# Patient Record
Sex: FEMALE | Race: WHITE | NOT HISPANIC OR LATINO | Employment: UNEMPLOYED | URBAN - METROPOLITAN AREA
[De-identification: names, ages, dates, MRNs, and addresses within clinical notes are randomized per-mention and may not be internally consistent; named-entity substitution may affect disease eponyms.]

---

## 2024-04-04 ENCOUNTER — TELEPHONE (OUTPATIENT)
Dept: BEHAVIORAL HEALTH | Facility: CLINIC | Age: 49
End: 2024-04-04
Payer: COMMERCIAL

## 2024-04-04 ENCOUNTER — PATIENT MESSAGE (OUTPATIENT)
Dept: BEHAVIORAL HEALTH | Facility: CLINIC | Age: 49
End: 2024-04-04
Payer: COMMERCIAL

## 2024-04-04 ENCOUNTER — OFFICE VISIT (OUTPATIENT)
Dept: FAMILY MEDICINE | Facility: CLINIC | Age: 49
End: 2024-04-04
Payer: COMMERCIAL

## 2024-04-04 VITALS
DIASTOLIC BLOOD PRESSURE: 78 MMHG | BODY MASS INDEX: 20.66 KG/M2 | SYSTOLIC BLOOD PRESSURE: 122 MMHG | TEMPERATURE: 98 F | OXYGEN SATURATION: 98 % | WEIGHT: 152.56 LBS | HEART RATE: 89 BPM | HEIGHT: 72 IN

## 2024-04-04 DIAGNOSIS — Z76.89 ENCOUNTER TO ESTABLISH CARE: ICD-10-CM

## 2024-04-04 DIAGNOSIS — M54.41 LOW BACK PAIN WITH RIGHT-SIDED SCIATICA, UNSPECIFIED BACK PAIN LATERALITY, UNSPECIFIED CHRONICITY: ICD-10-CM

## 2024-04-04 DIAGNOSIS — F41.1 GENERALIZED ANXIETY DISORDER: ICD-10-CM

## 2024-04-04 DIAGNOSIS — Z00.00 ENCOUNTER FOR ANNUAL GENERAL MEDICAL EXAMINATION WITHOUT ABNORMAL FINDINGS IN ADULT: Primary | ICD-10-CM

## 2024-04-04 DIAGNOSIS — F10.10 ALCOHOL ABUSE: ICD-10-CM

## 2024-04-04 DIAGNOSIS — Z12.11 COLON CANCER SCREENING: ICD-10-CM

## 2024-04-04 DIAGNOSIS — F33.1 MODERATE EPISODE OF RECURRENT MAJOR DEPRESSIVE DISORDER: ICD-10-CM

## 2024-04-04 PROCEDURE — 99205 OFFICE O/P NEW HI 60 MIN: CPT | Mod: S$PBB,,, | Performed by: STUDENT IN AN ORGANIZED HEALTH CARE EDUCATION/TRAINING PROGRAM

## 2024-04-04 PROCEDURE — 99205 OFFICE O/P NEW HI 60 MIN: CPT | Mod: PBBFAC,PO | Performed by: STUDENT IN AN ORGANIZED HEALTH CARE EDUCATION/TRAINING PROGRAM

## 2024-04-04 PROCEDURE — 99999 PR PBB SHADOW E&M-NEW PATIENT-LVL V: CPT | Mod: PBBFAC,,, | Performed by: STUDENT IN AN ORGANIZED HEALTH CARE EDUCATION/TRAINING PROGRAM

## 2024-04-04 RX ORDER — NALTREXONE HYDROCHLORIDE 50 MG/1
50 TABLET, FILM COATED ORAL DAILY
COMMUNITY

## 2024-04-04 RX ORDER — TIZANIDINE 2 MG/1
4 TABLET ORAL EVERY 8 HOURS PRN
Qty: 30 TABLET | Refills: 0 | Status: SHIPPED | OUTPATIENT
Start: 2024-04-04 | End: 2024-04-14

## 2024-04-04 RX ORDER — IBUPROFEN 800 MG/1
800 TABLET ORAL 3 TIMES DAILY
Qty: 90 TABLET | Refills: 0 | Status: SHIPPED | OUTPATIENT
Start: 2024-04-04 | End: 2024-05-04

## 2024-04-04 RX ORDER — VENLAFAXINE 75 MG/1
75 TABLET ORAL 2 TIMES DAILY
COMMUNITY

## 2024-04-04 NOTE — PROGRESS NOTES
SUBJECTIVE:    CHIEF COMPLAINT:   Chief Complaint   Patient presents with    Establish Care     Back pain           274}    HISTORY OF PRESENT ILLNESS:  Iam Metz is a 48 y.o. female  with a past medical history of anxiety, depression, scoliosis and alcohol use disorder  here to establish care.   She reports that she continues to receive care from behavioral health / psychiatrist in [place].  She ass recently relocated and has been present in [facility] for approximately 1 year.  She is tearful as she is no longer engaged to her prior fiancee.     She does report worsening low back pain which is exacerbated in extension and somewhat improved with flexion forward. denies any chest pain, shortness of breath, abdominal pain, fevers, chills, nausea, vomiting, diarrhea or dysuria.     PAST MEDICAL HISTORY:     274}  Past Medical History:   Diagnosis Date    Anxiety     Depression     Scoliosis        PAST SURGICAL HISTORY:  Past Surgical History:   Procedure Laterality Date    arm surgery      breast implants         SOCIAL HISTORY:  Social History     Socioeconomic History    Marital status: Significant Other   Tobacco Use    Smoking status: Every Day     Current packs/day: 1.00     Types: Cigarettes    Smokeless tobacco: Never     Social Determinants of Health     Financial Resource Strain: High Risk (2/20/2024)    Overall Financial Resource Strain (CARDIA)     Difficulty of Paying Living Expenses: Very hard   Food Insecurity: Food Insecurity Present (2/20/2024)    Hunger Vital Sign     Worried About Running Out of Food in the Last Year: Sometimes true     Ran Out of Food in the Last Year: Sometimes true   Transportation Needs: No Transportation Needs (2/20/2024)    PRAPARE - Transportation     Lack of Transportation (Medical): No     Lack of Transportation (Non-Medical): No   Physical Activity: Insufficiently Active (2/20/2024)    Exercise Vital Sign     Days of Exercise per Week: 2 days     Minutes of  Exercise per Session: 30 min   Stress: Stress Concern Present (2/20/2024)    [employer] [place] [employer] - Occupational Stress Questionnaire     Feeling of Stress : Rather much   Social Connections: Unknown (2/20/2024)    Social Connection and Isolation Panel [NHANES]     Frequency of Communication with Friends and Family: Once a week     Frequency of Social Gatherings with Friends and Family: Never     Active Member of Clubs or Organizations: No     Attends Club or Organization Meetings: Never     Marital Status: Living with partner   Housing Stability: Unknown (2/20/2024)    Housing Stability Vital Sign     Unable to Pay for Housing in the Last Year: Patient declined     Number of Places Lived in the Last Year: 1     Unstable Housing in the Last Year: No       FAMILY HISTORY:       Family History   Problem Relation Age of Onset    Hypertension Mother     Atrial fibrillation Mother     Depression Father     Alzheimer's disease Father        ALLERGIES AND MEDICATIONS: updated and reviewed.      274}  Review of patient's allergies indicates:  Not on File  Medication List with Changes/Refills   New Medications    IBUPROFEN (ADVIL,MOTRIN) 800 MG TABLET    Take 1 tablet (800 mg total) by mouth 3 (three) times daily. For back pain    TIZANIDINE (ZANAFLEX) 2 MG TABLET    Take 2 tablets (4 mg total) by mouth every 8 (eight) hours as needed (back pain).   Current Medications    LAMOTRIGINE (LAMICTAL ORAL)    Take by mouth once daily.    NALTREXONE (DEPADE) 50 MG TABLET    Take 50 mg by mouth once daily.    VENLAFAXINE (EFFEXOR) 75 MG TABLET    Take 75 mg by mouth 2 (two) times daily.       SCREENING HISTORY:    274}  Health Maintenance         Date Due Completion Date    Hepatitis C Screening Never done ---    Cervical Cancer Screening Never done ---    Lipid Panel Never done ---    COVID-19 Vaccine (1) Never done ---    Pneumococcal Vaccines (Age 0-64) (1 of 2 - PCV) Never done ---    HIV Screening Never done  ---    TETANUS VACCINE Never done ---    Mammogram Never done ---    Colorectal Cancer Screening Never done ---    Influenza Vaccine (1) Never done ---            REVIEW OF SYSTEMS:   Review of Systems   All other systems reviewed and are negative.      PHYSICAL EXAM:      274}  /78 (BP Location: Right arm, Patient Position: Sitting, BP Method: Small (Manual))   Pulse 89   Temp 97.9 °F (36.6 °C)   Ht 6' (1.829 m)   Wt 69.2 kg (152 lb 8.9 oz)   SpO2 98%   BMI 20.69 kg/m²   Wt Readings from Last 3 Encounters:   04/04/24 69.2 kg (152 lb 8.9 oz)     BP Readings from Last 3 Encounters:   04/04/24 122/78     Estimated body mass index is 20.69 kg/m² as calculated from the following:    Height as of this encounter: 6' (1.829 m).    Weight as of this encounter: 69.2 kg (152 lb 8.9 oz).     Physical Exam  Vitals reviewed.   Constitutional:       General: She is not in acute distress.     Appearance: Normal appearance. She is well-developed and normal weight. She is not ill-appearing.   HENT:      Head: Normocephalic and atraumatic.      Right Ear: External ear normal.      Left Ear: External ear normal.      Nose: Nose normal.   Eyes:      Extraocular Movements: Extraocular movements intact.      Conjunctiva/sclera: Conjunctivae normal.      Pupils: Pupils are equal, round, and reactive to light.   Neck:      Thyroid: No thyroid mass.   Cardiovascular:      Rate and Rhythm: Normal rate and regular rhythm.      Pulses: Normal pulses.      Heart sounds: Normal heart sounds, S1 normal and S2 normal. No murmur heard.     No gallop.   Pulmonary:      Effort: Pulmonary effort is normal. No respiratory distress.      Breath sounds: Normal breath sounds and air entry. No stridor. No wheezing, rhonchi or rales.   Abdominal:      General: There is no distension.      Palpations: Abdomen is soft. There is no mass.      Tenderness: There is no abdominal tenderness.   Musculoskeletal:         General: No swelling or deformity.  "Normal range of motion.      Cervical back: Normal range of motion and neck supple. No edema or erythema.   Skin:     General: Skin is warm and dry.      Findings: No lesion or rash.   Neurological:      Mental Status: She is alert and oriented to person, place, and time. Mental status is at baseline.   Psychiatric:         Mood and Affect: Mood normal.         Behavior: Behavior normal. Behavior is cooperative.         Judgment: Judgment normal.         LABS:   274}  I have reviewed old labs below:  No results found for: "WBC", "HGB", "HCT", "MCV", "PLT", "NA", "K", "CL", "CALCIUM", "PHOS", "CO2", "GLU", "BUN", "CREATININE", "ANIONGAP", "ESTGFRAFRICA", "EGFRNONAA", "PROT", "ALBUMIN", "BILITOT", "ALKPHOS", "ALT", "AST", "INR", "CHOL", "TRIG", "HDL", "LDLCALC", "TSH", "PSA", "GLUF", "HGBA1C", "MICROALBUR"    ASSESSMENT AND PLAN:  274}  1. Encounter for annual general medical examination without abnormal findings in adult  -     HIV 1/2 Ag/Ab (4th Gen); Future; Expected date: 04/04/2024  -     Hepatitis C Antibody; Future; Expected date: 04/04/2024  -     TSH; Future; Expected date: 04/04/2024  -     Lipid Panel; Future; Expected date: 04/04/2024  -     Hemoglobin A1C; Future; Expected date: 04/04/2024  -     Comprehensive Metabolic Panel; Future; Expected date: 04/04/2024  -     CBC Without Differential; Future; Expected date: 04/04/2024  -     Ferritin; Future; Expected date: 04/04/2024    2. Encounter to establish care    3. Moderate episode of recurrent major depressive disorder  Assessment & Plan:  On effexor 75mg PO daily    Orders:  -     Ambulatory referral/consult to Psychiatry; Future; Expected date: 04/11/2024  -     Ambulatory referral/consult to Primary Care Behavioral Health (Non-Opioids); Future; Expected date: 04/11/2024    4. Generalized anxiety disorder  Assessment & Plan:  Effexor 75mg PO daily, and Lamical therapy.    Orders:  -     Ambulatory referral/consult to Psychiatry; Future; Expected date: " 04/11/2024  -     Ambulatory referral/consult to Primary Care Behavioral Health (Non-Opioids); Future; Expected date: 04/11/2024    5. Alcohol abuse  Comments:  Consumes 6-8 Budlight beers every other day. stated on Naltrexone per sch  Orders:  -     Ambulatory referral/consult to Psychiatry; Future; Expected date: 04/11/2024  -     Ambulatory referral/consult to Primary Care Behavioral Health (Non-Opioids); Future; Expected date: 04/11/2024    6. Low back pain with right-sided sciatica, unspecified back pain laterality, unspecified chronicity  -     CT Lumbar Spine Without Contrast; Future; Expected date: 04/04/2024  -     ibuprofen (ADVIL,MOTRIN) 800 MG tablet; Take 1 tablet (800 mg total) by mouth 3 (three) times daily. For back pain  Dispense: 90 tablet; Refill: 0  -     tiZANidine (ZANAFLEX) 2 MG tablet; Take 2 tablets (4 mg total) by mouth every 8 (eight) hours as needed (back pain).  Dispense: 30 tablet; Refill: 0  -     ALCOHOL,MEDICAL (ETHANOL); Future; Expected date: 04/04/2024    7. Colon cancer screening  -     Cologuard Screening (Multitarget Stool DNA); Future; Expected date: 04/04/2024           Orders Placed This Encounter   Procedures    Cologuard Screening (Multitarget Stool DNA)    CT Lumbar Spine Without Contrast    HIV 1/2 Ag/Ab (4th Gen)    Hepatitis C Antibody    TSH    Lipid Panel    Hemoglobin A1C    Comprehensive Metabolic Panel    CBC Without Differential    Ferritin    ALCOHOL,MEDICAL (ETHANOL)    Ambulatory referral/consult to Psychiatry    Ambulatory referral/consult to Primary Care Behavioral Health (Non-Opioids)       Follow up in about 2 weeks (around 4/18/2024) for f/u back pain, CT and labs. or sooner as needed.

## 2024-04-09 ENCOUNTER — HOSPITAL ENCOUNTER (OUTPATIENT)
Dept: RADIOLOGY | Facility: HOSPITAL | Age: 49
Discharge: HOME | End: 2024-04-09
Attending: STUDENT IN AN ORGANIZED HEALTH CARE EDUCATION/TRAINING PROGRAM

## 2024-04-09 ENCOUNTER — LAB VISIT (OUTPATIENT)
Dept: LAB | Facility: HOSPITAL | Age: 49
End: 2024-04-09
Attending: STUDENT IN AN ORGANIZED HEALTH CARE EDUCATION/TRAINING PROGRAM
Payer: COMMERCIAL

## 2024-04-09 DIAGNOSIS — Z00.00 ENCOUNTER FOR ANNUAL GENERAL MEDICAL EXAMINATION WITHOUT ABNORMAL FINDINGS IN ADULT: ICD-10-CM

## 2024-04-09 DIAGNOSIS — M54.41 LOW BACK PAIN WITH RIGHT-SIDED SCIATICA, UNSPECIFIED BACK PAIN LATERALITY, UNSPECIFIED CHRONICITY: ICD-10-CM

## 2024-04-09 LAB
ALBUMIN SERPL BCP-MCNC: 4 G/DL (ref 3.5–5.2)
ALP SERPL-CCNC: 75 U/L (ref 55–135)
ALT SERPL W/O P-5'-P-CCNC: 20 U/L (ref 10–44)
ANION GAP SERPL CALC-SCNC: 12 MMOL/L (ref 8–16)
AST SERPL-CCNC: 21 U/L (ref 10–40)
BILIRUB SERPL-MCNC: 0.3 MG/DL (ref 0.1–1)
BUN SERPL-MCNC: 14 MG/DL (ref 6–20)
CALCIUM SERPL-MCNC: 10.1 MG/DL (ref 8.7–10.5)
CHLORIDE SERPL-SCNC: 103 MMOL/L (ref 95–110)
CHOLEST SERPL-MCNC: 238 MG/DL (ref 120–199)
CHOLEST/HDLC SERPL: 3.1 {RATIO} (ref 2–5)
CO2 SERPL-SCNC: 27 MMOL/L (ref 23–29)
CREAT SERPL-MCNC: 0.8 MG/DL (ref 0.5–1.4)
ERYTHROCYTE [DISTWIDTH] IN BLOOD BY AUTOMATED COUNT: 12.8 % (ref 11.5–14.5)
EST. GFR  (NO RACE VARIABLE): >60 ML/MIN/1.73 M^2
ESTIMATED AVG GLUCOSE: 108 MG/DL (ref 68–131)
FERRITIN SERPL-MCNC: 46 NG/ML (ref 20–300)
GLUCOSE SERPL-MCNC: 104 MG/DL (ref 70–110)
HBA1C MFR BLD: 5.4 % (ref 4–5.6)
HCT VFR BLD AUTO: 45.3 % (ref 37–48.5)
HCV AB SERPL QL IA: NORMAL
HDLC SERPL-MCNC: 78 MG/DL (ref 40–75)
HDLC SERPL: 32.8 % (ref 20–50)
HGB BLD-MCNC: 14.3 G/DL (ref 12–16)
HIV 1+2 AB+HIV1 P24 AG SERPL QL IA: NORMAL
LDLC SERPL CALC-MCNC: 144 MG/DL (ref 63–159)
MCH RBC QN AUTO: 29.5 PG (ref 27–31)
MCHC RBC AUTO-ENTMCNC: 31.6 G/DL (ref 32–36)
MCV RBC AUTO: 93 FL (ref 82–98)
NONHDLC SERPL-MCNC: 160 MG/DL
PLATELET # BLD AUTO: 258 K/UL (ref 150–450)
PMV BLD AUTO: 10.8 FL (ref 9.2–12.9)
POTASSIUM SERPL-SCNC: 4.7 MMOL/L (ref 3.5–5.1)
PROT SERPL-MCNC: 7.3 G/DL (ref 6–8.4)
RBC # BLD AUTO: 4.85 M/UL (ref 4–5.4)
SODIUM SERPL-SCNC: 142 MMOL/L (ref 136–145)
TRIGL SERPL-MCNC: 80 MG/DL (ref 30–150)
TSH SERPL DL<=0.005 MIU/L-ACNC: 1.68 UIU/ML (ref 0.4–4)
WBC # BLD AUTO: 7.33 K/UL (ref 3.9–12.7)

## 2024-04-09 PROCEDURE — 85027 COMPLETE CBC AUTOMATED: CPT | Performed by: STUDENT IN AN ORGANIZED HEALTH CARE EDUCATION/TRAINING PROGRAM

## 2024-04-09 PROCEDURE — 82728 ASSAY OF FERRITIN: CPT | Performed by: STUDENT IN AN ORGANIZED HEALTH CARE EDUCATION/TRAINING PROGRAM

## 2024-04-09 PROCEDURE — 80061 LIPID PANEL: CPT | Performed by: STUDENT IN AN ORGANIZED HEALTH CARE EDUCATION/TRAINING PROGRAM

## 2024-04-09 PROCEDURE — 36415 COLL VENOUS BLD VENIPUNCTURE: CPT | Mod: PO | Performed by: STUDENT IN AN ORGANIZED HEALTH CARE EDUCATION/TRAINING PROGRAM

## 2024-04-09 PROCEDURE — 83036 HEMOGLOBIN GLYCOSYLATED A1C: CPT | Performed by: STUDENT IN AN ORGANIZED HEALTH CARE EDUCATION/TRAINING PROGRAM

## 2024-04-09 PROCEDURE — 84443 ASSAY THYROID STIM HORMONE: CPT | Performed by: STUDENT IN AN ORGANIZED HEALTH CARE EDUCATION/TRAINING PROGRAM

## 2024-04-09 PROCEDURE — 87389 HIV-1 AG W/HIV-1&-2 AB AG IA: CPT | Performed by: STUDENT IN AN ORGANIZED HEALTH CARE EDUCATION/TRAINING PROGRAM

## 2024-04-09 PROCEDURE — 72131 CT LUMBAR SPINE W/O DYE: CPT | Mod: TC,PO

## 2024-04-09 PROCEDURE — 86803 HEPATITIS C AB TEST: CPT | Performed by: STUDENT IN AN ORGANIZED HEALTH CARE EDUCATION/TRAINING PROGRAM

## 2024-04-09 PROCEDURE — 80053 COMPREHEN METABOLIC PANEL: CPT | Performed by: STUDENT IN AN ORGANIZED HEALTH CARE EDUCATION/TRAINING PROGRAM

## 2024-04-10 DIAGNOSIS — Z12.31 OTHER SCREENING MAMMOGRAM: ICD-10-CM

## 2024-04-12 DIAGNOSIS — M53.9 MULTILEVEL DEGENERATIVE DISC DISEASE: ICD-10-CM

## 2024-04-12 DIAGNOSIS — M51.16 LUMBAR DISC HERNIATION WITH RADICULOPATHY: Primary | ICD-10-CM

## 2024-04-26 ENCOUNTER — TELEPHONE (OUTPATIENT)
Dept: BEHAVIORAL HEALTH | Facility: CLINIC | Age: 49
End: 2024-04-26
Payer: MEDICAID

## 2024-04-26 NOTE — PROGRESS NOTES
Behavioral Health Community Health Worker  Initial Assessment  Completed by:  Ene Singh    Date:  4/26/2024    Patient Enrollment in Behavioral Health Program:  Patient verbalized understanding of Behavioral Health Integration services to include:  Patient understands that CHW, LCSW, PharmD and consulting Psychiatrist are members of the care team working collaboratively with his/her primary care provider: Yes  Patient understands that activation of their [facility] patient portal account is required for accessing the full scope of team services: Yes  Patient understands that some counseling sessions may occur via video: Yes  Clinic visits with the psychiatrist may be subject to a co-pay based on your insurance: Yes  Patient consents to enroll in BHI program: Yes    Assessments     Single Item Health Literacy Scale:       Promis 10:       Depression PHQ:      4/26/2024     9:50 AM 4/4/2024    10:22 AM   PHQ-9 Depression Patient Health Questionnaire   Over the last two weeks how often have you been bothered by little interest or pleasure in doing things 3 2   Over the last two weeks how often have you been bothered by feeling down, depressed or hopeless 3 3   Over the last two weeks how often have you been bothered by trouble falling or staying asleep, or sleeping too much 1 0   Over the last two weeks how often have you been bothered by feeling tired or having little energy 3 3   Over the last two weeks how often have you been bothered by a poor appetite or overeating 0 0   Over the last two weeks how often have you been bothered by feeling bad about yourself - or that you are a failure or have let yourself or your family down 3 3   Over the last two weeks how often have you been bothered by trouble concentrating on things, such as reading the newspaper or watching television 0 0   Over the last two weeks how often have you been bothered by moving or speaking so slowly that other people could have noticed. 0 0    Over the last two weeks how often have you been bothered by thoughts that you would be better off dead, or of hurting yourself 1 1   If you checked off any problems, how difficult have these problems made it for you to do your work, take care of things at home or get along with other people? Very difficult Very difficult   PHQ-9 Score 14 12          Generalized Anxiety Disorder 7-Item Scale:      4/26/2024     9:53 AM   GAD7   1. Feeling nervous, anxious, or on edge? 1   2. Not being able to stop or control worrying? 1   3. Worrying too much about different things? 1   4. Trouble relaxing? 0   5. Being so restless that it is hard to sit still? 0   6. Becoming easily annoyed or irritable? 0   7. Feeling afraid as if something awful might happen? 1   8. If you checked off any problems, how difficult have these problems made it for you to do your work, take care of things at home, or get along with other people? 1   YOVANNY-7 Score 4       History     Social History     Socioeconomic History    Marital status: Significant Other   Tobacco Use    Smoking status: Every Day     Current packs/day: 1.00     Types: Cigarettes    Smokeless tobacco: Never     Social Determinants of Health     Financial Resource Strain: High Risk (2/20/2024)    Overall Financial Resource Strain (CARDIA)     Difficulty of Paying Living Expenses: Very hard   Food Insecurity: Food Insecurity Present (2/20/2024)    Hunger Vital Sign     Worried About Running Out of Food in the Last Year: Sometimes true     Ran Out of Food in the Last Year: Sometimes true   Transportation Needs: No Transportation Needs (2/20/2024)    PRAPARE - Transportation     Lack of Transportation (Medical): No     Lack of Transportation (Non-Medical): No   Physical Activity: Insufficiently Active (2/20/2024)    Exercise Vital Sign     Days of Exercise per Week: 2 days     Minutes of Exercise per Session: 30 min   Stress: Stress Concern Present (2/20/2024)    [employer] [place] [employer]  "Occupational Health - Occupational Stress Questionnaire     Feeling of Stress : Rather much   Social Connections: Unknown (2/20/2024)    Social Connection and Isolation Panel [NHANES]     Frequency of Communication with Friends and Family: Once a week     Frequency of Social Gatherings with Friends and Family: Never     Active Member of Clubs or Organizations: No     Attends Club or Organization Meetings: Never     Marital Status: Living with partner   Housing Stability: Unknown (2/20/2024)    Housing Stability Vital Sign     Unable to Pay for Housing in the Last Year: Patient declined     Number of Places Lived in the Last Year: 1     Unstable Housing in the Last Year: No       Call Summary   Patient was referred to the BHI (Non-opioid) program by Primary Care Provider, Dr. Benedict CHW contacted Iam Metz who reports anxiety/depression that limits [his/her] activities of daily living (ADLs).   Patient scored "14" on the PHQ9 and "4" on the YOVANNY 7. Based on these scores patient is eligible for the Behavioral health Integration (Non-opioid) Program. CHW completed the intake and scheduled an appointment for patient with Dr Zofia Johnson, LPC,PhD on 5/3/24.  Emergent information given to patient if she need it.     "

## 2024-04-26 NOTE — TELEPHONE ENCOUNTER
Patient is seeing a psychiatrist in [place] for meds not therapy she lives here in [place] wants talk therapy here

## 2024-04-29 ENCOUNTER — OFFICE VISIT (OUTPATIENT)
Dept: FAMILY MEDICINE | Facility: CLINIC | Age: 49
End: 2024-04-29
Payer: MEDICAID

## 2024-04-29 ENCOUNTER — TELEPHONE (OUTPATIENT)
Dept: PSYCHIATRY | Facility: CLINIC | Age: 49
End: 2024-04-29
Payer: MEDICAID

## 2024-04-29 VITALS
OXYGEN SATURATION: 99 % | BODY MASS INDEX: 20.96 KG/M2 | HEART RATE: 93 BPM | WEIGHT: 154.75 LBS | SYSTOLIC BLOOD PRESSURE: 130 MMHG | DIASTOLIC BLOOD PRESSURE: 82 MMHG | HEIGHT: 72 IN | RESPIRATION RATE: 18 BRPM

## 2024-04-29 DIAGNOSIS — M51.16 LUMBAR DISC HERNIATION WITH RADICULOPATHY: ICD-10-CM

## 2024-04-29 DIAGNOSIS — M41.9 SCOLIOSIS, UNSPECIFIED SCOLIOSIS TYPE, UNSPECIFIED SPINAL REGION: ICD-10-CM

## 2024-04-29 DIAGNOSIS — M51.36 DEGENERATIVE DISC DISEASE, LUMBAR: Primary | ICD-10-CM

## 2024-04-29 PROCEDURE — 3044F HG A1C LEVEL LT 7.0%: CPT | Mod: CPTII,,, | Performed by: FAMILY MEDICINE

## 2024-04-29 PROCEDURE — 99214 OFFICE O/P EST MOD 30 MIN: CPT | Mod: PBBFAC,PO | Performed by: FAMILY MEDICINE

## 2024-04-29 PROCEDURE — 3008F BODY MASS INDEX DOCD: CPT | Mod: CPTII,,, | Performed by: FAMILY MEDICINE

## 2024-04-29 PROCEDURE — 1160F RVW MEDS BY RX/DR IN RCRD: CPT | Mod: CPTII,,, | Performed by: FAMILY MEDICINE

## 2024-04-29 PROCEDURE — 3075F SYST BP GE 130 - 139MM HG: CPT | Mod: CPTII,,, | Performed by: FAMILY MEDICINE

## 2024-04-29 PROCEDURE — 99999 PR PBB SHADOW E&M-EST. PATIENT-LVL IV: CPT | Mod: PBBFAC,,, | Performed by: FAMILY MEDICINE

## 2024-04-29 PROCEDURE — 3079F DIAST BP 80-89 MM HG: CPT | Mod: CPTII,,, | Performed by: FAMILY MEDICINE

## 2024-04-29 PROCEDURE — 99213 OFFICE O/P EST LOW 20 MIN: CPT | Mod: S$PBB,,, | Performed by: FAMILY MEDICINE

## 2024-04-29 PROCEDURE — 1159F MED LIST DOCD IN RCRD: CPT | Mod: CPTII,,, | Performed by: FAMILY MEDICINE

## 2024-04-29 NOTE — PROGRESS NOTES
Subjective:       Patient ID: Iam Metz is a 48 y.o. female.    Chief Complaint: Follow-up (2 week f/u)    This patient is new to me.  Iam Metz presents to the clinic today for 2 week follow up on back pain. Patient had CT done previously which showed scoliosis, degenerative disc, and a kidney stone. Patient had blood work done recently and this was also reviewed with patient today.   Patient educated on plan of care, verbalized understanding.         Review of Systems   Constitutional:  Negative for activity change, appetite change, chills, diaphoresis and fever.   HENT:  Negative for congestion, ear pain, postnasal drip, sinus pressure, sneezing and sore throat.    Eyes:  Negative for pain, discharge, redness and itching.   Respiratory:  Negative for apnea, cough, chest tightness, shortness of breath and wheezing.    Cardiovascular:  Negative for chest pain and leg swelling.   Gastrointestinal:  Negative for abdominal distention, abdominal pain, constipation, diarrhea, nausea and vomiting.   Genitourinary:  Negative for difficulty urinating, dysuria, flank pain and frequency.   Musculoskeletal:  Positive for back pain.   Skin:  Negative for color change, rash and wound.   Neurological:  Negative for dizziness.       Patient Active Problem List   Diagnosis    Moderate episode of recurrent major depressive disorder    Generalized anxiety disorder       Objective:      Physical Exam  Vitals reviewed.   Constitutional:       General: She is not in acute distress.     Appearance: Normal appearance. She is well-developed.   HENT:      Head: Normocephalic.      Nose: Nose normal.   Eyes:      Conjunctiva/sclera: Conjunctivae normal.      Pupils: Pupils are equal, round, and reactive to light.   Cardiovascular:      Rate and Rhythm: Normal rate.   Pulmonary:      Effort: Pulmonary effort is normal. No respiratory distress.   Musculoskeletal:      Cervical back: Normal range of motion and neck supple.   Skin:      General: Skin is warm and dry.      Findings: No rash.   Neurological:      Mental Status: She is alert and oriented to person, place, and time.   Psychiatric:         Mood and Affect: Mood normal.         Behavior: Behavior normal.         Lab Results   Component Value Date    WBC 7.33 04/09/2024    HGB 14.3 04/09/2024    HCT 45.3 04/09/2024     04/09/2024    CHOL 238 (H) 04/09/2024    TRIG 80 04/09/2024    HDL 78 (H) 04/09/2024    ALT 20 04/09/2024    AST 21 04/09/2024     04/09/2024    K 4.7 04/09/2024     04/09/2024    CREATININE 0.8 04/09/2024    BUN 14 04/09/2024    CO2 27 04/09/2024    TSH 1.684 04/09/2024    HGBA1C 5.4 04/09/2024     The 10-year ASCVD risk score (Richa SCHMIDT, et al., 2019) is: 2.6%    Values used to calculate the score:      Age: 48 years      Sex: Female      Is Non- : No      Diabetic: No      Tobacco smoker: Yes      Systolic Blood Pressure: 130 mmHg      Is BP treated: No      HDL Cholesterol: 78 mg/dL      Total Cholesterol: 238 mg/dL  Visit Vitals  /82 (BP Location: Right arm, Patient Position: Sitting, BP Method: Medium (Manual))   Pulse 93   Resp 18   Ht 6' (1.829 m)   Wt 70.2 kg (154 lb 12.2 oz)   SpO2 99%   BMI 20.99 kg/m²      Assessment:       1. Degenerative disc disease, lumbar    2. Lumbar disc herniation with radiculopathy    3. Scoliosis, unspecified scoliosis type, unspecified spinal region        Plan:       Iam Knutson was seen today for follow-up.    Diagnoses and all orders for this visit:    Degenerative disc disease, lumbar / Lumbar disc herniation with radiculopathy / Scoliosis, unspecified scoliosis type, unspecified spinal region  -     Ambulatory referral/consult to Back & Spine Clinic; Future  Continue ibuprofen for pain as directed  Continue medications as prescribed.  Follow up with PCP      Follow up if symptoms worsen or fail to improve.      Future Appointments       Date Provider Specialty Appt Notes    5/3/2024  Zofia Johnson, LPC Behavioral Health New             Tests to Keep You Healthy    Mammogram: ORDERED BUT NOT SCHEDULED  Colon Cancer Screening: ORDERED  Cervical Cancer Screening: DUE

## 2024-05-03 ENCOUNTER — TELEPHONE (OUTPATIENT)
Dept: BEHAVIORAL HEALTH | Facility: CLINIC | Age: 49
End: 2024-05-03
Payer: MEDICAID

## 2024-05-03 ENCOUNTER — PATIENT MESSAGE (OUTPATIENT)
Dept: BEHAVIORAL HEALTH | Facility: CLINIC | Age: 49
End: 2024-05-03
Payer: MEDICAID

## 2024-05-03 ENCOUNTER — OFFICE VISIT (OUTPATIENT)
Dept: BEHAVIORAL HEALTH | Facility: CLINIC | Age: 49
End: 2024-05-03
Payer: MEDICAID

## 2024-05-03 DIAGNOSIS — F10.10 ALCOHOL ABUSE: ICD-10-CM

## 2024-05-03 DIAGNOSIS — F41.1 GENERALIZED ANXIETY DISORDER: ICD-10-CM

## 2024-05-03 DIAGNOSIS — F33.1 MODERATE EPISODE OF RECURRENT MAJOR DEPRESSIVE DISORDER: ICD-10-CM

## 2024-05-03 PROCEDURE — 3044F HG A1C LEVEL LT 7.0%: CPT | Mod: CPTII,,, | Performed by: COUNSELOR

## 2024-05-03 PROCEDURE — 90791 PSYCH DIAGNOSTIC EVALUATION: CPT | Mod: HO,HB,, | Performed by: COUNSELOR
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
## 2024-05-03 NOTE — PROGRESS NOTES
Contacted patient to schedule follow up no answer left VM to pls call to schedule follow up appt. Sent portal message to pls call.

## 2024-06-12 ENCOUNTER — PATIENT MESSAGE (OUTPATIENT)
Dept: FAMILY MEDICINE | Facility: CLINIC | Age: 49
End: 2024-06-12
Payer: MEDICAID

## 2024-06-19 ENCOUNTER — PATIENT MESSAGE (OUTPATIENT)
Dept: FAMILY MEDICINE | Facility: CLINIC | Age: 49
End: 2024-06-19
Payer: MEDICAID

## 2024-06-19 DIAGNOSIS — M51.36 DEGENERATIVE DISC DISEASE, LUMBAR: Primary | ICD-10-CM

## 2024-07-29 ENCOUNTER — HOSPITAL ENCOUNTER (OUTPATIENT)
Dept: RADIOLOGY | Facility: HOSPITAL | Age: 49
Discharge: HOME | End: 2024-07-29
Attending: ORTHOPAEDIC SURGERY
Payer: MEDICAID

## 2024-07-29 ENCOUNTER — OFFICE VISIT (OUTPATIENT)
Dept: ORTHOPEDICS | Facility: CLINIC | Age: 49
End: 2024-07-29
Payer: MEDICAID

## 2024-07-29 VITALS — WEIGHT: 154.75 LBS | BODY MASS INDEX: 20.96 KG/M2 | HEIGHT: 72 IN

## 2024-07-29 DIAGNOSIS — M41.9 SCOLIOSIS OF THORACOLUMBAR SPINE, UNSPECIFIED SCOLIOSIS TYPE: Primary | ICD-10-CM

## 2024-07-29 DIAGNOSIS — M47.816 FACET ARTHRITIS OF LUMBAR REGION: ICD-10-CM

## 2024-07-29 DIAGNOSIS — M51.36 DEGENERATIVE DISC DISEASE, LUMBAR: ICD-10-CM

## 2024-07-29 PROCEDURE — 99213 OFFICE O/P EST LOW 20 MIN: CPT | Mod: PBBFAC,25,PN | Performed by: ORTHOPAEDIC SURGERY

## 2024-07-29 PROCEDURE — 99999 PR PBB SHADOW E&M-EST. PATIENT-LVL III: CPT | Mod: PBBFAC,,, | Performed by: ORTHOPAEDIC SURGERY

## 2024-07-29 PROCEDURE — 3008F BODY MASS INDEX DOCD: CPT | Mod: CPTII,,, | Performed by: ORTHOPAEDIC SURGERY

## 2024-07-29 PROCEDURE — 72170 X-RAY EXAM OF PELVIS: CPT | Mod: TC,PN

## 2024-07-29 PROCEDURE — 1159F MED LIST DOCD IN RCRD: CPT | Mod: CPTII,,, | Performed by: ORTHOPAEDIC SURGERY

## 2024-07-29 PROCEDURE — 3044F HG A1C LEVEL LT 7.0%: CPT | Mod: CPTII,,, | Performed by: ORTHOPAEDIC SURGERY

## 2024-07-29 PROCEDURE — 1160F RVW MEDS BY RX/DR IN RCRD: CPT | Mod: CPTII,,, | Performed by: ORTHOPAEDIC SURGERY

## 2024-07-29 PROCEDURE — 72100 X-RAY EXAM L-S SPINE 2/3 VWS: CPT | Mod: TC,PN

## 2024-07-29 PROCEDURE — 72100 X-RAY EXAM L-S SPINE 2/3 VWS: CPT | Mod: 26,,, | Performed by: RADIOLOGY

## 2024-07-29 PROCEDURE — 99203 OFFICE O/P NEW LOW 30 MIN: CPT | Mod: S$PBB,,, | Performed by: ORTHOPAEDIC SURGERY

## 2024-07-29 PROCEDURE — 72170 X-RAY EXAM OF PELVIS: CPT | Mod: 26,,, | Performed by: RADIOLOGY

## 2024-07-29 RX ORDER — MELOXICAM 7.5 MG/1
7.5 TABLET ORAL
Qty: 60 TABLET | Refills: 0 | Status: SHIPPED | OUTPATIENT
Start: 2024-07-29 | End: 2024-08-28

## 2024-07-29 NOTE — PROGRESS NOTES
Subjective:       Patient ID: Iam Metz is a 49 y.o. female.    Chief Complaint: Pain of the Lumbar Spine (Patient is here with complaints of Chronic Lumbar pain, pain is Mid-back and lower, denies numbness & tingling, no known injuries, pain prevents her from sleeping. Has CT scan. )      History of Present Illness    Prior to meeting with the patient I reviewed the medical chart in [facility]. This included reviewing the previous progress notes from our office, review of the patient's last appointment with their primary care provider, review of any visits to the emergency room, and review of any pain management appointments or procedures.   Twenty-five year history of chronic back pain without radiation.  Has not had any specific treatment pains on a daily basis no leg pain numbness tingling or burning or weakness.  No bowel or bladder dysfunction pain on a daily basis not related to weather changes.    Current Medications  Current Outpatient Medications   Medication Sig Dispense Refill    lamotrigine (LAMICTAL ORAL) Take by mouth once daily.      naltrexone (DEPADE) 50 mg tablet Take 50 mg by mouth once daily.      venlafaxine (EFFEXOR) 75 MG tablet Take 75 mg by mouth 2 (two) times daily.       No current facility-administered medications for this visit.       Allergies  Review of patient's allergies indicates:  No Known Allergies    Past Medical History  Past Medical History:   Diagnosis Date    Anxiety     Depression     Scoliosis        Surgical History  Past Surgical History:   Procedure Laterality Date    arm surgery      breast implants         Family History:   Family History   Problem Relation Name Age of Onset    Hypertension Mother      Atrial fibrillation Mother      Depression Father      Alzheimer's disease Father         Social History:   Social History     Socioeconomic History    Marital status: Significant Other   Tobacco Use    Smoking status: Every Day     Current packs/day: 1.00     Types:  Cigarettes    Smokeless tobacco: Never     Social Determinants of Health     Financial Resource Strain: High Risk (2/20/2024)    Overall Financial Resource Strain (CARDIA)     Difficulty of Paying Living Expenses: Very hard   Food Insecurity: Food Insecurity Present (2/20/2024)    Hunger Vital Sign     Worried About Running Out of Food in the Last Year: Sometimes true     Ran Out of Food in the Last Year: Sometimes true   Transportation Needs: No Transportation Needs (2/20/2024)    PRAPARE - Transportation     Lack of Transportation (Medical): No     Lack of Transportation (Non-Medical): No   Physical Activity: Insufficiently Active (2/20/2024)    Exercise Vital Sign     Days of Exercise per Week: 2 days     Minutes of Exercise per Session: 30 min   Stress: Stress Concern Present (2/20/2024)    [employer] [place] [employer] - Occupational Stress Questionnaire     Feeling of Stress : Rather much   Housing Stability: Unknown (2/20/2024)    Housing Stability Vital Sign     Unable to Pay for Housing in the Last Year: Patient declined     Number of Places Lived in the Last Year: 1     Unstable Housing in the Last Year: No       Hospitalization/Major Diagnostic Procedure:     Review of Systems     General/Constitutional:  Chills denies. Fatigue denies. Fever denies. Weight gain denies. Weight loss denies.    Respiratory:  Shortness of breath denies.    Cardiovascular:  Chest pain denies.    Gastrointestinal:  Constipation denies. Diarrhea denies. Nausea denies. Vomiting denies.     Hematology:  Easy bruising denies. Prolonged bleeding denies.     Genitourinary:  Frequent urination denies. Pain in lower back denies. Painful urination denies.     Musculoskeletal:  See HPI for details    Skin:  Rash denies.    Neurologic:  Dizziness denies. Gait abnormalities denies. Seizures denies. Tingling/Numbess denies.    Psychiatric:  Anxiety denies. Depressed mood denies.     Objective:   Vital Signs: There were no vitals  filed for this visit.     Physical Exam      General Examination:     Constitutional: The patient is alert and oriented to lace person and time. Mood is pleasant.     Head/Face: Normal facial features normal eyebrows    Eyes: Normal extraocular motion bilaterally    Lungs: Respirations are equal and unlabored    Gait is coordinated.    Cardiovascular: There are no swelling or varicosities present.    Lymphatic: Negative for adenopathy    Skin: Normal    Neurological: Level of consciousness normal. Oriented to place person and time and situation    Psychiatric: Oriented to time place person and situation    Patient stand erect has pain when doing so right rib hump noted with forward flexion range of motion limited no spasm tender from midthoracic to the thoracolumbar junction to the lumbosacral junction.  Seated straight leg raising maneuver negative hip and knee range motion intact motor exam normal all major muscle groups both lower extremities.    XRAY Report/ Interpretation :  AP pelvis x-ray was taken today. Indications low back pain and/or hip pain. Findings AP pelvis x-ray appears to be normal with no evidence of fractures or significant degenerative disease  AP and lateral x-rays of lumbar spine will performed today. Indications low back pain. Findings:  Right moderate thoracolumbar scoliosis noted no acute changes      Assessment:       1. Scoliosis of thoracolumbar spine, unspecified scoliosis type    2. Degenerative disc disease, lumbar    3. Facet arthritis of lumbar region        Plan:       Iam Knutson was seen today for pain.    Diagnoses and all orders for this visit:    Scoliosis of thoracolumbar spine, unspecified scoliosis type  -     X-Ray Lumbar Spine Ap And Lateral  -     X-Ray Pelvis Routine AP  -     Ambulatory referral/consult to Orthopedics  -     Ambulatory referral/consult to Physical/Occupational Therapy; Future    Degenerative disc disease, lumbar  -     X-Ray Lumbar Spine Ap And Lateral  -      X-Ray Pelvis Routine AP  -     Ambulatory referral/consult to Orthopedics  -     Ambulatory referral/consult to Physical/Occupational Therapy; Future    Facet arthritis of lumbar region  -     X-Ray Lumbar Spine Ap And Lateral  -     X-Ray Pelvis Routine AP  -     Ambulatory referral/consult to Orthopedics  -     Ambulatory referral/consult to Physical/Occupational Therapy; Future         Follow up for 6 week f/u - lumbar pain, PT f/u.    Treatment options discussed patient referred for outpatient physical therapy short-term meloxicam return 6 weeks if unimproved consider MRI study and possible pain management referral.    Patient warm respect potential side effects of medication    Treatment options were discussed with regards to the nature of the medical condition. Conservative pain intervention and surgical options were discussed in detail. The probability of success of each separate treatment option was discussed. The patient expressed a clear understanding of the treatment options. With regards to surgery, the procedure risk, benefits, complications, and outcomes were discussed. No guarantees were given with regards to surgical outcome.   The risk of complications, morbidity, and mortality of patient management decisions have been made at the time of this visit. These are associated with the patient's problems, diagnostic procedures and treatment options. This includes the possible management options selected and those considered but not selected by the patient after shared medical decision making we discussed with the patient.     This note was created using Dragon voice recognition software that occasionally misinterpreted phrases or words.

## 2024-08-05 ENCOUNTER — PATIENT MESSAGE (OUTPATIENT)
Dept: ADMINISTRATIVE | Facility: HOSPITAL | Age: 49
End: 2024-08-05
Payer: MEDICAID

## 2024-08-06 ENCOUNTER — CLINICAL SUPPORT (OUTPATIENT)
Dept: REHABILITATION | Facility: HOSPITAL | Age: 49
End: 2024-08-06
Payer: MEDICAID

## 2024-08-06 DIAGNOSIS — M51.36 DEGENERATIVE DISC DISEASE, LUMBAR: ICD-10-CM

## 2024-08-06 DIAGNOSIS — M47.816 FACET ARTHRITIS OF LUMBAR REGION: ICD-10-CM

## 2024-08-06 DIAGNOSIS — R53.1 STRENGTH LOSS OF: ICD-10-CM

## 2024-08-06 DIAGNOSIS — R29.3 POSTURE ABNORMALITY: Primary | ICD-10-CM

## 2024-08-06 DIAGNOSIS — M41.9 SCOLIOSIS OF THORACOLUMBAR SPINE, UNSPECIFIED SCOLIOSIS TYPE: ICD-10-CM

## 2024-08-06 PROCEDURE — 97110 THERAPEUTIC EXERCISES: CPT | Mod: PN

## 2024-08-06 PROCEDURE — 97161 PT EVAL LOW COMPLEX 20 MIN: CPT | Mod: PN

## 2024-08-07 ENCOUNTER — PATIENT MESSAGE (OUTPATIENT)
Dept: REHABILITATION | Facility: HOSPITAL | Age: 49
End: 2024-08-07
Payer: MEDICAID

## 2024-08-14 ENCOUNTER — CLINICAL SUPPORT (OUTPATIENT)
Dept: REHABILITATION | Facility: HOSPITAL | Age: 49
End: 2024-08-14
Payer: MEDICAID

## 2024-08-14 DIAGNOSIS — R29.3 POSTURE ABNORMALITY: Primary | ICD-10-CM

## 2024-08-14 DIAGNOSIS — R53.1 STRENGTH LOSS OF: ICD-10-CM

## 2024-08-14 PROCEDURE — 97110 THERAPEUTIC EXERCISES: CPT | Mod: PN

## 2024-08-14 NOTE — PROGRESS NOTES
[facility]   Physical Therapy Treatment Note     [facility]  Clinic Number: [id number]    Therapy Diagnosis: No diagnosis found.  Physician: Dharmesh Sutton MD    Visit Date: 8/14/2024    Physician Orders: PT  Eval and Treat  Medical Diagnosis from Referral: [id number] (ICD-10-CM) - Scoliosis of thoracolumbar spine, unspecified scoliosis typeM51.36 (ICD-10-CM) - Degenerative disc disease, [id number] (ICD-10-CM) - Facet arthritis of lumbar region  Evaluation Date: 8/6/2024  Authorization Period Expiration: 07/29/2025   Plan of Care Expiration: 10/15/2024  Progress Note Due: 9/6/2024  Visit # / Visits authorized: 1/ 20  FOTO: 1/ 3      FOTO 1st: 45%  Predicted Score: 56%  FOTO 3rd:  FOTO 10th:    Time in: 1000am  Time out: 1045am  Total Billable Time: 45 minutes    Precautions:  Standard       SUBJECTIVE     Pt reports: Feeling better mentally. Still having pain but has had better days too. Went to run errands and did well with that but she overdid it and was in more pain later.     She was compliant with home exercise program.  Response to previous treatment: ongoing  Functional change: ongoing    Pain: 3/10  Location: low back      OBJECTIVE     Objective Measures updated at progress report unless specified.     Treatment       Iam Knutson received the treatments listed below:      Therapeutic exercises to develop strength, endurance, ROM, flexibility, posture, and core stabilization for 45 minutes including:    Nustep 10' level 1 for activity tolerance  Side Lying open books x 20 Bilateral   Side Lying clamshells 2 x 15 Bilateral   Bridges 3 x 10  Transverse abdominus contraction with swiss ball in supine 3 x 10  Straight leg raise 2 x 8 Bilateral   Paloff press green band 3 x 10      Patient Education and Home Exercises     Home Exercises Provided and Patient Education Provided     Education provided:   - Home Exercise Program     Written Home Exercises Provided: Patient  instructed to cont prior HEP. Exercises were reviewed and Iam Knutson was able to demonstrate them prior to the end of the session.  Iam Knutson demonstrated good  understanding of the education provided. See EMR under Patient Instructions for exercises provided during therapy sessions    ASSESSMENT     Iam Knutson presented with improved symptoms. We reviewed Home Exercise Program and she did well with that. Progressed from there with hip and core strengthening. No increase in pain, only muscle fatigue.     Iam Knutson is progressing well towards her goals.     Pt prognosis is Fair.     Pt will continue to benefit from skilled outpatient physical therapy to address the deficits listed in the problem list box on initial evaluation, provide pt/family education and to maximize pt's level of independence in the home and community environment.     Pt's spiritual, cultural and educational needs considered and pt agreeable to plan of care and goals.     Anticipated barriers to physical therapy:chronic pain, scoliosis     Goals:   Short Term Goals: 5 weeks -Progressing, not met   Patient will be independent in Home Exercise Program to support improving mobility and strength.  Patient will have decreased pain at max to 5/10.  Patient will have improved squat form to aid in improving functional strength.      Long Term Goals: 10 weeks -Progressing, not met   Patient will be independent in progressed Home Exercise Program to support improving mobility and strength.  Patient goal: wants to be able to walk around and explore without pain   Patient will have improved FOTO score to predicted level to show true functional improvements.     PLAN   Plan of care Certification: 8/6/2024 to 10/15/2024.     Transfer to [facility].    Raquel Salmon, PT , DPT   Board Certified Clinical Specialist in Orthopedic Physical Therapy

## 2024-08-21 ENCOUNTER — CLINICAL SUPPORT (OUTPATIENT)
Dept: REHABILITATION | Facility: HOSPITAL | Age: 49
End: 2024-08-21
Payer: MEDICAID

## 2024-08-21 DIAGNOSIS — R53.1 STRENGTH LOSS OF: ICD-10-CM

## 2024-08-21 DIAGNOSIS — R29.3 POSTURE ABNORMALITY: Primary | ICD-10-CM

## 2024-08-21 PROCEDURE — 97110 THERAPEUTIC EXERCISES: CPT | Mod: PN,CQ

## 2024-08-21 NOTE — PROGRESS NOTES
[facility]   Physical Therapy Treatment Note     [facility]  Cl[facility] Number: [id number]    Therapy Diagnosis: No diagnosis found.  Physician: Dharmesh Sutton MD    Visit Date: 8/21/2024    Physician Orders: PT  Eval and Treat  Medical Diagnosis from Referral: [id number] (ICD-10-CM) - Scoliosis of thoracolumbar spine, unspecified scoliosis typeM51.36 (ICD-10-CM) - Degenerative disc disease, [id number] (ICD-10-CM) - Facet arthritis of lumbar region  Evaluation Date: 8/6/2024  Authorization Period Expiration: 07/29/2025   Plan of Care Expiration: 10/15/2024  Progress Note Due: 9/6/2024  Visit # / Visits authorized: 1/ 20  FOTO: 1/ 3      FOTO 1st: 45%  Predicted Score: 56%  FOTO 3rd:  FOTO 10th:    Time in: 0800  Time out: 0901  Total Billable Time: 61 minutes    Precautions:  Standard       SUBJECTIVE     Pt reports: she is having pain today.  Pain is more in her mid back than lower today.      She was compliant with home exercise program.  Response to previous treatment: ongoing  Functional change: ongoing    Pain: 4-5/10  Location: mid back      OBJECTIVE     Objective Measures updated at progress report unless specified.     Treatment       Iam Knutson received the treatments listed below:      ++All charges filed per Medicaid guidelines++    Therapeutic exercises to develop strength, endurance, ROM, flexibility, posture, and core stabilization for  61 minutes including:    Baselines - discomfort with flexion and extension.  No pain with rotation and side glides.    Nustep 10' level 3 for activity tolerance    Standing extension, over elevated mat 2 x 10 reps   Side Lying open books x 20 reps Bilateral   Side Lying clamshells 2 x 10 reps Bilateral   Bridges 3 x 10 reps     +Prone press ups x 10 reps   +prone hip extension x 20 reps   Transverse abdominus contraction with swiss ball in supine 3 x 10 reps     Precor machines  Torso rotation 15# x 20 reps (RPE - 3)  Chest press  10# x 10 reps (RPE -  7)  Rows 20# x 20 reps (RPE - 4)      Not performed:  Straight leg raise 2 x 8 reps Bilateral   Paloff press green band 3 x 10 reps       Patient Education and Home Exercises     Home Exercises Provided and Patient Education Provided     Education provided:   - Home Exercise Program     Written Home Exercises Provided: Patient instructed to cont prior HEP. Exercises were reviewed and Iam Knutson was able to demonstrate them prior to the end of the session.  Iam Knutson demonstrated good  understanding of the education provided. See EMR under Patient Instructions for exercises provided during therapy sessions    ASSESSMENT     Iam Knutson presents to PT today with some pain in her mid back.  She transferred from ortho to [facility] program.  She appears to be a good candidate for the program.  She has pain with flexion and extension but responded well with extension exercises today.      Iam Knutson is progressing well towards her goals.     Pt prognosis is Fair.     Pt will continue to benefit from skilled outpatient physical therapy to address the deficits listed in the problem list box on initial evaluation, provide pt/family education and to maximize pt's level of independence in the home and community environment.     Pt's spiritual, cultural and educational needs considered and pt agreeable to plan of care and goals.     Anticipated barriers to physical therapy:chronic pain, scoliosis     Goals:   Short Term Goals: 5 weeks -Progressing, not met   Patient will be independent in Home Exercise Program to support improving mobility and strength.  Patient will have decreased pain at max to 5/10.  Patient will have improved squat form to aid in improving functional strength.      Long Term Goals: 10 weeks -Progressing, not met   Patient will be independent in progressed Home Exercise Program to support improving mobility and strength.  Patient goal: wants to be able to walk around and explore without pain    Patient will have improved FOTO score to predicted level to show true functional improvements.     PLAN   Plan of care Certification: 8/6/2024 to 10/15/2024.         Jaye Stern, PTA

## 2024-08-27 ENCOUNTER — CLINICAL SUPPORT (OUTPATIENT)
Dept: REHABILITATION | Facility: HOSPITAL | Age: 49
End: 2024-08-27
Payer: MEDICAID

## 2024-08-27 DIAGNOSIS — R53.1 STRENGTH LOSS OF: ICD-10-CM

## 2024-08-27 DIAGNOSIS — R29.3 POSTURE ABNORMALITY: Primary | ICD-10-CM

## 2024-08-27 PROCEDURE — 97110 THERAPEUTIC EXERCISES: CPT | Mod: PN

## 2024-08-27 NOTE — PLAN OF CARE
[facility]  Physical Therapy Plan of Care Note     Name: Iam Metz  Clinic Number: [id number]    Therapy Diagnosis:   Encounter Diagnoses   Name Primary?    Posture abnormality Yes    Strength loss of      Physician: Dharmesh Sutton MD    Visit Date: 2024    Physician Orders: PT  Eval and Treat  Medical Diagnosis from Referral: [id number] (ICD-10-CM) - Scoliosis of thoracolumbar spine, unspecified scoliosis typeM51.36 (ICD-10-CM) - Degenerative disc disease, [id number] (ICD-10-CM) - Facet arthritis of lumbar region  Evaluation Date: 2024  Authorization Period Expiration: 2025   Plan of Care Expiration: 10/15/2024  Progress Note Due: 2024  Visit # / Visits authorized: 3 / 20  FOTO: 1/ 3    Precautions: Standard  Functional Level Prior to Evaluation:  independent     SUBJECTIVE     Update: : R sided mid to low back pain. Lower R back pain into the glut with prolong sitting, standing and walking.       OBJECTIVE     Update:     Baseline IM Testing Results:   Date of testin2024  ROM 0-54 deg   Max Peak Torque 101    Min Peak Torque 16    Flex/Ext Ratio 6.3   % below normative data 65%      FOTO: 45%        2024     9:28 AM   [facility]   Visit Number 1   VAS Pain Rating 2   Lumbar Extension Seat Pad 0   Femur Restraint 6   Top Dead Center 24   Counterweight 279   Lumbar Flexion 54   Lumbar Extension 0   Lumbar Peak Torque 101 ft. lbs.   Min Torque 16   Test Percent Below Normative Data 65 %     ASSESSMENT     Update:     Arrives with continuation of R sided back pain with prolong sitting, standing and walking. She has been referred by evaluating ortho therapist to the HB program to address chronic back pain. She has been compliant with her HEP. She is able to demonstrate HEP with minimal cues. Standing active lumbar range of motion and passive and resisted hip baselines did not reproduced  concordant pain. She tolerated prone press ups well  with limited extension range of motion noted. PA mobs performed to improve lumbar extension range of motion, however she did report some tingling in the R leg but no worse with continued mobs. This was the only time throughout tx session she reported these symptoms. She tolerated all other exercises well. Medx testing performed demonstrating limited lumbar range of motion and strength based on norms. Goals have been updated to reflect medx testing today. She is appropriate for transition into the HB program.     Previous Short Term Goals Status:   progressing   New Short Term Goals Status:   updated to reflect transition into the HB program   Long Term Goal Status: modified:     updated to reflect transition into the HB program   Reasons for Recertification of Therapy:      updated to reflect transition into the HB program     GOALS      Short term goals:  6 weeks or 10 visits   - Pt will demonstrate increased lumbar MedX ROM by at least 3 degrees from the initial ROM value with improvements noted in functional ROM and ability to perform ADLs. Appropriate and Ongoing  - Pt will demonstrate increased MedX average isometric strength value by 15% from initial test resulting in improved ability to perform bending, lifting, and carrying activities safely, confidently. Appropriate and Ongoing  - Pt will report a reduction in worst pain score by 1-2 points for improved tolerance for standing and walking. Appropriate and Ongoing  - Pt able to perform HEP correctly with minimal cueing or supervision from therapist to encourage independent management of symptoms. Appropriate and Ongoing     Long term goals: 10 weeks or 20 visits   - Pt will demonstrate increased lumbar MedX ROM by at least 6 degrees from initial ROM value, resulting in improved ability to perform functional forward bending while standing and sitting. Appropriate and Ongoing  - Pt will demonstrate increased MedX average isometric strength value by 30% from  initial test resulting in improved ability to perform bending, lifting, and carrying activities safely and confidently. Appropriate and Ongoing  - Pt to demonstrate ability to independently control and reduce their pain through posture positioning and mechanical movements throughout a typical day. Appropriate and Ongoing  - Pt will demonstrate reduced pain and improved functional outcomes as reported on the FOTO by reaching an intake score of >/= 45% functional ability in order to demonstrate subjective improvement in patient's condition. . Appropriate and Ongoing  - Pt will demonstrate independence with the HEP at discharge. Appropriate and Ongoing  - Patient will report >/= 50 % improvement in R back pain since evaluation to improve quality of life and functional mobility Appropriate and Ongoing    PLAN     Updated Certification Period: 8/27/2024 to 12/27/2024   Recommended Treatment Plan: 2 times per week for 10 weeks:  Gait Training, Manual Therapy, Moist Heat/ Ice, Neuromuscular Re-ed, Patient Education, Therapeutic Activities, and Therapeutic Exercise  Other Recommendations: none     Elissa Vizcarra, PT

## 2024-08-27 NOTE — PROGRESS NOTES
[facility]   Physical Therapy Treatment Note     [facility]  Clinic Number: [id number]    Therapy Diagnosis:   Encounter Diagnoses   Name Primary?    Posture abnormality Yes    Strength loss of      Physician: Dharmesh Sutton MD    Visit Date: 2024    Physician Orders: PT  Eval and Treat  Medical Diagnosis from Referral: [id number] (ICD-10-CM) - Scoliosis of thoracolumbar spine, unspecified scoliosis typeM51.36 (ICD-10-CM) - Degenerative disc disease, [id number] (ICD-10-CM) - Facet arthritis of lumbar region  Evaluation Date: 2024  Authorization Period Expiration: 2025   Plan of Care Expiration: 10/15/2024  Progress Note Due: 2024  Visit # / Visits authorized: 3 / 20  FOTO: 1/ 3      FOTO 1st: 45%  Predicted Score: 56%  FOTO 3rd:  FOTO 10th:    Time in: 805 AM  Time out: 905 AM  Total Billable Time: 60 minutes    Precautions:  Standard     SUBJECTIVE     Pt reports: R sided mid to low back pain. Lower R back pain into the glut with prolong sitting, standing and walking.     She was compliant with home exercise program.  Response to previous treatment: ongoing  Functional change: ongoing    Pain: 2/10  Location: mid back      OBJECTIVE     Objective Measures updated at progress report unless specified.     Baseline IM Testing Results:   Date of testin2024  ROM 0-54 deg   Max Peak Torque 101    Min Peak Torque 16    Flex/Ext Ratio 6.3   % below normative data 65%     FOTO: 45%    Treatment     Iam Knutson received the treatments listed below:      ++All charges filed per Medicaid guidelines++    Therapeutic exercises to develop strength, endurance, ROM, flexibility, posture, and core stabilization for 60 minutes including:    Baselines    No pain with flexion, extension, Sgs or rotation    Limited range of motion with extension mainly, R rib hump noted with flexion     Pain L hip with passive IR     Ambulation on treadmill x 5 minutes at 2.0 mph    Standing repeated extensions over edge of mat 15 repetitions   Prone press ups x 10 reps    Prone press up x 10 repetitions following mobs  Prone hip extension 2 x 10 reps    Pain L side when lifting RLE   Bridging 20 repetitions   Sidelying ER clams yellow theraband 20 repetitions each side     Educated on use of lumbar roll and HB program visit 1 handout    Chest press only this date due to medx testing performed and time     Peripheral muscle strengthening which included 1 set of 15-20 repetitions at a slow, controlled 10-13 second per rep pace focused on strengthening supporting musculature for improved body mechanics and functional mobility.  Pt and therapist focused on proper form during treatment to ensure optimal strengthening of each targeted muscle group.  Machines were utilized including torso rotation, chest press, rowing, biceps, and triceps. Leg extension, leg curl, hip abd, hip add, and leg press added visit 3    Medx testin/27/2024     9:28 AM   [facility]   Visit Number 1   VAS Pain Rating 2   Lumbar Extension Seat Pad 0   Femur Restraint 6   Top Dead Center 24   Counterweight 279   Lumbar Flexion 54   Lumbar Extension 0   Lumbar Peak Torque 101 ft. lbs.   Min Torque 16   Test Percent Below Normative Data 65 %            Patient Education and Home Exercises     Home Exercises Provided and Patient Education Provided     Education provided:   - Home Exercise Program     Written Home Exercises Provided: Patient instructed to cont prior HEP. Exercises were reviewed and Iam Knutson was able to demonstrate them prior to the end of the session.  Iam Knutson demonstrated good  understanding of the education provided. See EMR under Patient Instructions for exercises provided during therapy sessions    ASSESSMENT     Arrives with continuation of R sided back pain with prolong sitting, standing and walking. She has been referred by evaluating ortho therapist to the HB program to address chronic  back pain. She has been compliant with her HEP. She is able to demonstrate HEP with minimal cues. Standing active lumbar range of motion and passive and resisted hip baselines did not reproduced  concordant pain. She tolerated prone press ups well with limited extension range of motion noted. PA mobs performed to improve lumbar extension range of motion, however she did report some tingling in the R leg but no worse with continued mobs. This was the only time throughout tx session she reported these symptoms. She tolerated all other exercises well. Medx testing performed demonstrating limited lumbar range of motion and strength based on norms. Goals have been updated to reflect medx testing today. She is appropriate for transition into the HB program.     Iam Knutson is progressing well towards her goals.     Pt prognosis is Fair.     Pt will continue to benefit from skilled outpatient physical therapy to address the deficits listed in the problem list box on initial evaluation, provide pt/family education and to maximize pt's level of independence in the home and community environment.     Pt's spiritual, cultural and educational needs considered and pt agreeable to plan of care and goals.     Anticipated barriers to physical therapy:chronic pain, scoliosis     GOALS: Pt is in agreement with the following goals.    Short term goals:  6 weeks or 10 visits   - Pt will demonstrate increased lumbar MedX ROM by at least 3 degrees from the initial ROM value with improvements noted in functional ROM and ability to perform ADLs. Appropriate and Ongoing  - Pt will demonstrate increased MedX average isometric strength value by 15% from initial test resulting in improved ability to perform bending, lifting, and carrying activities safely, confidently. Appropriate and Ongoing  - Pt will report a reduction in worst pain score by 1-2 points for improved tolerance for standing and walking. Appropriate and Ongoing  - Pt able to  perform HEP correctly with minimal cueing or supervision from therapist to encourage independent management of symptoms. Appropriate and Ongoing    Long term goals: 10 weeks or 20 visits   - Pt will demonstrate increased lumbar MedX ROM by at least 6 degrees from initial ROM value, resulting in improved ability to perform functional forward bending while standing and sitting. Appropriate and Ongoing  - Pt will demonstrate increased MedX average isometric strength value by 30% from initial test resulting in improved ability to perform bending, lifting, and carrying activities safely and confidently. Appropriate and Ongoing  - Pt to demonstrate ability to independently control and reduce their pain through posture positioning and mechanical movements throughout a typical day. Appropriate and Ongoing  - Pt will demonstrate reduced pain and improved functional outcomes as reported on the FOTO by reaching an intake score of >/= 45% functional ability in order to demonstrate subjective improvement in patient's condition. . Appropriate and Ongoing  - Pt will demonstrate independence with the HEP at discharge. Appropriate and Ongoing  - Patient will report >/= 50 % improvement in R back pain since evaluation to improve quality of life and functional mobility Appropriate and Ongoing    PLAN     See updated Poc for transfer into HB program.     Elissa Vizcarra, PT

## 2024-09-03 ENCOUNTER — CLINICAL SUPPORT (OUTPATIENT)
Dept: REHABILITATION | Facility: HOSPITAL | Age: 49
End: 2024-09-03
Payer: MEDICAID

## 2024-09-03 DIAGNOSIS — R53.1 STRENGTH LOSS OF: ICD-10-CM

## 2024-09-03 DIAGNOSIS — R29.3 POSTURE ABNORMALITY: Primary | ICD-10-CM

## 2024-09-03 PROCEDURE — 97110 THERAPEUTIC EXERCISES: CPT | Mod: PN

## 2024-09-03 NOTE — PROGRESS NOTES
[facility]   Physical Therapy Treatment Note     Name: Iam Metz  Clinic Number: [id number]    Therapy Diagnosis:   Encounter Diagnoses   Name Primary?    Posture abnormality Yes    Strength loss of      Physician: Dharmesh Sutton MD    Visit Date: 9/3/2024    Physician Orders: PT  Eval and Treat  Medical Diagnosis from Referral: [id number] (ICD-10-CM) - Scoliosis of thoracolumbar spine, unspecified scoliosis typeM51.36 (ICD-10-CM) - Degenerative disc disease, [id number] (ICD-10-CM) - Facet arthritis of lumbar region  Evaluation Date: 2024  Authorization Period Expiration: 2025   Plan of Care Expiration: 10/15/2024  Progress Note Due: 2024  Visit # / Visits authorized:  ( HB)   FOTO: 1/ 3      FOTO 1st: 45%  Predicted Score: 56%  FOTO 3rd:  FOTO 10th:    Time in: 912 AM  Time out: 1000 AM  Total Billable Time: 23 minutes (48)     Precautions:  Standard     SUBJECTIVE     Pt reports: 8/10 pain yesterday. She didn't do her exercises because she wasn't sure if she was supposed to.     She was compliant with home exercise program.  Response to previous treatment: ongoing  Functional change: ongoing    Pain: 210  Location: mid back      OBJECTIVE     Objective Measures updated at progress report unless specified.     Baseline IM Testing Results:   Date of testin2024  ROM 0-54 deg   Max Peak Torque 101    Min Peak Torque 16    Flex/Ext Ratio 6.3   % below normative data 65%     FOTO: 45%    Treatment     Iam Knutson received the treatments listed below:      ++All charges filed per Medicaid guidelines++    Therapeutic exercises to develop strength, endurance, ROM, flexibility, posture, and core stabilization for 48 minutes including:    Bike x 3 minutes (treadmill taken)   Prone press ups x 10 reps    Prone press up x 10 repetitions therapist overpressure following mobs  PA mobs grade 3, sharp pain at L4/L5, improvement following repeated prone press  ups therapist   Prone hip extension 2 x 10 reps   Bridging 20 repetitions   Sidelying open books R side only 15 repetitions ( R rib hump)  Sidelying ER clams yellow theraband 20 repetitions each side     Peripheral muscle strengthening which included 1 set of 15-20 repetitions at a slow, controlled 10-13 second per rep pace focused on strengthening supporting musculature for improved body mechanics and functional mobility.  Pt and therapist focused on proper form during treatment to ensure optimal strengthening of each targeted muscle group.  Machines were utilized including torso rotation, chest press, rowing, biceps, and triceps. Leg extension, leg curl, hip abd, hip add, and leg press added visit 3    Medx:          9/3/2024    10:00 AM   [facility]   Visit Number 2   VAS Pain Rating 2   Lumbar Weight 50 lbs   Repetitions 15   Rating of Perceived Exertion 4     Patient Education and Home Exercises     Home Exercises Provided and Patient Education Provided     Education provided:   - Home Exercise Program     Written Home Exercises Provided: Patient instructed to cont prior HEP. Exercises were reviewed and Iam Knutson was able to demonstrate them prior to the end of the session.  Iam Knutson demonstrated good  understanding of the education provided. See EMR under Patient Instructions for exercises provided during therapy sessions    ASSESSMENT     Arrives with continuation of pain. Reports minimal compliance with HEP. She did not do her exercises yesterday because of the pain. Physical Therapist educated patient on importance of doing her repeated movements at home when she is having the pain so we can assess change in her pain following her repeated movements. She verbalized good understanding. She tolerated tx session well. She reported pain with PA mobs to the L4/5 region but this improved following repeated prone press up with therapist overpressure. She tolerated first tx session on medx well set at 50%  peak torque.     Iam Knutson is progressing well towards her goals.     Pt prognosis is Fair.     Pt will continue to benefit from skilled outpatient physical therapy to address the deficits listed in the problem list box on initial evaluation, provide pt/family education and to maximize pt's level of independence in the home and community environment.     Pt's spiritual, cultural and educational needs considered and pt agreeable to plan of care and goals.     Anticipated barriers to physical therapy:chronic pain, scoliosis     GOALS: Pt is in agreement with the following goals.    Short term goals:  6 weeks or 10 visits   - Pt will demonstrate increased lumbar MedX ROM by at least 3 degrees from the initial ROM value with improvements noted in functional ROM and ability to perform ADLs. Appropriate and Ongoing  - Pt will demonstrate increased MedX average isometric strength value by 15% from initial test resulting in improved ability to perform bending, lifting, and carrying activities safely, confidently. Appropriate and Ongoing  - Pt will report a reduction in worst pain score by 1-2 points for improved tolerance for standing and walking. Appropriate and Ongoing  - Pt able to perform HEP correctly with minimal cueing or supervision from therapist to encourage independent management of symptoms. Appropriate and Ongoing    Long term goals: 10 weeks or 20 visits   - Pt will demonstrate increased lumbar MedX ROM by at least 6 degrees from initial ROM value, resulting in improved ability to perform functional forward bending while standing and sitting. Appropriate and Ongoing  - Pt will demonstrate increased MedX average isometric strength value by 30% from initial test resulting in improved ability to perform bending, lifting, and carrying activities safely and confidently. Appropriate and Ongoing  - Pt to demonstrate ability to independently control and reduce their pain through posture positioning and mechanical  movements throughout a typical day. Appropriate and Ongoing  - Pt will demonstrate reduced pain and improved functional outcomes as reported on the FOTO by reaching an intake score of >/= 45% functional ability in order to demonstrate subjective improvement in patient's condition. . Appropriate and Ongoing  - Pt will demonstrate independence with the HEP at discharge. Appropriate and Ongoing  - Patient will report >/= 50 % improvement in R back pain since evaluation to improve quality of life and functional mobility Appropriate and Ongoing    PLAN     Continue POC as indicated. Progress as tolerated.     Elissa Vizcarra, PT

## 2024-09-06 ENCOUNTER — CLINICAL SUPPORT (OUTPATIENT)
Dept: REHABILITATION | Facility: HOSPITAL | Age: 49
End: 2024-09-06
Payer: MEDICAID

## 2024-09-06 DIAGNOSIS — R29.3 POSTURE ABNORMALITY: Primary | ICD-10-CM

## 2024-09-06 DIAGNOSIS — R53.1 STRENGTH LOSS OF: ICD-10-CM

## 2024-09-06 PROCEDURE — 97110 THERAPEUTIC EXERCISES: CPT | Mod: PN,CQ

## 2024-09-06 NOTE — PROGRESS NOTES
[facility]   Physical Therapy Treatment Note     Name: Iam Metz  Clinic Number: [id number]    Therapy Diagnosis:   Encounter Diagnoses   Name Primary?    Posture abnormality Yes    Strength loss of      Physician: Dharmesh Sutton MD    Visit Date: 2024    Physician Orders: PT  Eval and Treat  Medical Diagnosis from Referral: [id number] (ICD-10-CM) - Scoliosis of thoracolumbar spine, unspecified scoliosis typeM51.36 (ICD-10-CM) - Degenerative disc disease, [id number] (ICD-10-CM) - Facet arthritis of lumbar region  Evaluation Date: 2024  Authorization Period Expiration: 2024   Plan of Care Expiration: 10/15/2024  Progress Note Due: 2024  Visit # / Visits authorized:  (   HB)   FOTO: 1/ 3      FOTO 1st: 45%  Predicted Score: 56%  FOTO 3rd:  FOTO 10th:    Time in: 1058  Time out: 1155  Total Billable Time: 57 minutes      Precautions:  Standard     SUBJECTIVE     Pt reports: her back is feeling good but her Right shoulder/arm is bothering her because she had to shovel something.      She was compliant with home exercise program.  Response to previous treatment: ongoing  Functional change: ongoing    Pain: 2/10  Location: mid back      OBJECTIVE     Objective Measures updated at progress report unless specified.     Baseline IM Testing Results:   Date of testin2024  ROM 0-54 deg   Max Peak Torque 101    Min Peak Torque 16    Flex/Ext Ratio 6.3   % below normative data 65%     FOTO: 45%    Treatment     Iam Knutson received the treatments listed below:      ++All charges filed per Medicaid guidelines++    Therapeutic exercises to develop strength, endurance, ROM, flexibility, posture, and core stabilization for 57 minutes including: participation in the Medical MedX and Peripheral muscle strengthening on the [employer] machines.      Treadmill x 4 minutes @ 2.0 mph     Prone press ups x 10 reps    Prone press up x 10 repetitions therapist overpressure  following mobs  PA mobs grade 3, sharp pain at L4/L5, improvement following repeated prone press ups therapist   Prone hip extension 2 x 10 reps     Bridging 20 repetitions   Sidelying open books R side only 15 repetitions ( R rib hump)  Sidelying external rotation  clams yellow theraband 20 repetitions each side        Patient Education and Home Exercises     Home Exercises Provided and Patient Education Provided     Education provided:   - Home Exercise Program     Written Home Exercises Provided: Patient instructed to cont prior HEP. Exercises were reviewed and Iam Knutson was able to demonstrate them prior to the end of the session.  Iam Knutson demonstrated good  understanding of the education provided. See EMR under Patient Instructions for exercises provided during therapy sessions    ASSESSMENT     Iam Knutson presents to PT without back pain this morning.  She does have pain down her Right arm and had some difficulty with press ups.    She had no difficulty with other exercises.      Iam Knutson is progressing well towards her goals.     Pt prognosis is Fair.     Pt will continue to benefit from skilled outpatient physical therapy to address the deficits listed in the problem list box on initial evaluation, provide pt/family education and to maximize pt's level of independence in the home and community environment.     Pt's spiritual, cultural and educational needs considered and pt agreeable to plan of care and goals.     Anticipated barriers to physical therapy:chronic pain, scoliosis     GOALS: Pt is in agreement with the following goals.    Short term goals:  6 weeks or 10 visits   - Pt will demonstrate increased lumbar MedX ROM by at least 3 degrees from the initial ROM value with improvements noted in functional ROM and ability to perform ADLs. Appropriate and Ongoing  - Pt will demonstrate increased MedX average isometric strength value by 15% from initial test resulting in improved ability to perform bending,  lifting, and carrying activities safely, confidently. Appropriate and Ongoing  - Pt will report a reduction in worst pain score by 1-2 points for improved tolerance for standing and walking. Appropriate and Ongoing  - Pt able to perform HEP correctly with minimal cueing or supervision from therapist to encourage independent management of symptoms. Appropriate and Ongoing    Long term goals: 10 weeks or 20 visits   - Pt will demonstrate increased lumbar MedX ROM by at least 6 degrees from initial ROM value, resulting in improved ability to perform functional forward bending while standing and sitting. Appropriate and Ongoing  - Pt will demonstrate increased MedX average isometric strength value by 30% from initial test resulting in improved ability to perform bending, lifting, and carrying activities safely and confidently. Appropriate and Ongoing  - Pt to demonstrate ability to independently control and reduce their pain through posture positioning and mechanical movements throughout a typical day. Appropriate and Ongoing  - Pt will demonstrate reduced pain and improved functional outcomes as reported on the FOTO by reaching an intake score of >/= 45% functional ability in order to demonstrate subjective improvement in patient's condition. . Appropriate and Ongoing  - Pt will demonstrate independence with the HEP at discharge. Appropriate and Ongoing  - Patient will report >/= 50 % improvement in R back pain since evaluation to improve quality of life and functional mobility Appropriate and Ongoing    PLAN     Continue POC as indicated. Progress as tolerated.     Jaye Stern, PTA

## 2024-09-09 ENCOUNTER — OFFICE VISIT (OUTPATIENT)
Dept: ORTHOPEDICS | Facility: CLINIC | Age: 49
End: 2024-09-09
Payer: MEDICAID

## 2024-09-09 VITALS — BODY MASS INDEX: 20.96 KG/M2 | HEIGHT: 72 IN | WEIGHT: 154.75 LBS

## 2024-09-09 DIAGNOSIS — M41.9 SCOLIOSIS OF THORACOLUMBAR SPINE, UNSPECIFIED SCOLIOSIS TYPE: Primary | ICD-10-CM

## 2024-09-09 DIAGNOSIS — M51.36 DEGENERATIVE DISC DISEASE, LUMBAR: ICD-10-CM

## 2024-09-09 DIAGNOSIS — M47.816 FACET ARTHRITIS OF LUMBAR REGION: ICD-10-CM

## 2024-09-09 PROCEDURE — 1159F MED LIST DOCD IN RCRD: CPT | Mod: CPTII,,, | Performed by: ORTHOPAEDIC SURGERY

## 2024-09-09 PROCEDURE — 3044F HG A1C LEVEL LT 7.0%: CPT | Mod: CPTII,,, | Performed by: ORTHOPAEDIC SURGERY

## 2024-09-09 PROCEDURE — 99213 OFFICE O/P EST LOW 20 MIN: CPT | Mod: PBBFAC,PN | Performed by: ORTHOPAEDIC SURGERY

## 2024-09-09 PROCEDURE — 1160F RVW MEDS BY RX/DR IN RCRD: CPT | Mod: CPTII,,, | Performed by: ORTHOPAEDIC SURGERY

## 2024-09-09 PROCEDURE — 99213 OFFICE O/P EST LOW 20 MIN: CPT | Mod: S$PBB,,, | Performed by: ORTHOPAEDIC SURGERY

## 2024-09-09 PROCEDURE — 99999 PR PBB SHADOW E&M-EST. PATIENT-LVL III: CPT | Mod: PBBFAC,,, | Performed by: ORTHOPAEDIC SURGERY

## 2024-09-09 PROCEDURE — 3008F BODY MASS INDEX DOCD: CPT | Mod: CPTII,,, | Performed by: ORTHOPAEDIC SURGERY

## 2024-09-09 RX ORDER — VENLAFAXINE HYDROCHLORIDE 150 MG/1
150 CAPSULE, EXTENDED RELEASE ORAL
COMMUNITY
Start: 2024-09-07

## 2024-09-09 RX ORDER — DICLOFENAC POTASSIUM 50 MG/1
50 TABLET, FILM COATED ORAL 2 TIMES DAILY
Qty: 60 TABLET | Refills: 0 | Status: SHIPPED | OUTPATIENT
Start: 2024-09-09 | End: 2024-10-09

## 2024-09-09 NOTE — PROGRESS NOTES
Subjective:       Patient ID: Iam Metz is a 49 y.o. female.    Chief Complaint: Pain of the Lumbar Spine (Patient is here for a f/u on lumbar pain, states pain hast stayed about the same since last visit. PT seems to be helping with strength and flexibility. Currently no pain )      History of Present Illness    Prior to meeting with the patient I reviewed the medical chart in [facility]. This included reviewing the previous progress notes from our office, review of the patient's last appointment with their primary care provider, review of any visits to the emergency room, and review of any pain management appointments or procedures.   Cleansed of axial thoracolumbar pain.  Underwent physical therapy with marginal improvement of symptoms.  No leg lower extremity complaints.  Meloxicam was not successful.    Current Medications  Current Outpatient Medications   Medication Sig Dispense Refill    lamotrigine (LAMICTAL ORAL) Take by mouth once daily.      naltrexone (DEPADE) 50 mg tablet Take 50 mg by mouth once daily.      venlafaxine (EFFEXOR-XR) 150 MG Cp24 Take 150 mg by mouth.      venlafaxine (EFFEXOR) 75 MG tablet Take 75 mg by mouth 2 (two) times daily. (Patient not taking: Reported on 9/9/2024)       No current facility-administered medications for this visit.       Allergies  Review of patient's allergies indicates:  No Known Allergies    Past Medical History  Past Medical History:   Diagnosis Date    Anxiety     Depression     Scoliosis        Surgical History  Past Surgical History:   Procedure Laterality Date    arm surgery      breast implants         Family History:   Family History   Problem Relation Name Age of Onset    Hypertension Mother      Atrial fibrillation Mother      Depression Father      Alzheimer's disease Father         Social History:   Social History     Socioeconomic History    Marital status: Significant Other   Tobacco Use    Smoking status: Every Day     Current packs/day: 1.00      Types: Cigarettes    Smokeless tobacco: Never     Social Determinants of Health     Financial Resource Strain: High Risk (2/20/2024)    Overall Financial Resource Strain (CARDIA)     Difficulty of Paying Living Expenses: Very hard   Food Insecurity: Food Insecurity Present (2/20/2024)    Hunger Vital Sign     Worried About Running Out of Food in the Last Year: Sometimes true     Ran Out of Food in the Last Year: Sometimes true   Transportation Needs: No Transportation Needs (2/20/2024)    PRAPARE - Transportation     Lack of Transportation (Medical): No     Lack of Transportation (Non-Medical): No   Physical Activity: Insufficiently Active (2/20/2024)    Exercise Vital Sign     Days of Exercise per Week: 2 days     Minutes of Exercise per Session: 30 min   Stress: Stress Concern Present (2/20/2024)    [employer] - Occupational Stress Questionnaire     Feeling of Stress : Rather much   Housing Stability: Unknown (2/20/2024)    Housing Stability Vital Sign     Unable to Pay for Housing in the Last Year: Patient declined     Number of Places Lived in the Last Year: 1     Unstable Housing in the Last Year: No       Hospitalization/Major Diagnostic Procedure:     Review of Systems     General/Constitutional:  Chills denies. Fatigue denies. Fever denies. Weight gain denies. Weight loss denies.    Respiratory:  Shortness of breath denies.    Cardiovascular:  Chest pain denies.    Gastrointestinal:  Constipation denies. Diarrhea denies. Nausea denies. Vomiting denies.     Hematology:  Easy bruising denies. Prolonged bleeding denies.     Genitourinary:  Frequent urination denies. Pain in lower back denies. Painful urination denies.     Musculoskeletal:  See HPI for details    Skin:  Rash denies.    Neurologic:  Dizziness denies. Gait abnormalities denies. Seizures denies. Tingling/Numbess denies.    Psychiatric:  Anxiety denies. Depressed mood denies.     Objective:   Vital Signs: There were no  vitals filed for this visit.     Physical Exam      General Examination:     Constitutional: The patient is alert and oriented to lace person and time. Mood is pleasant.     Head/Face: Normal facial features normal eyebrows    Eyes: Normal extraocular motion bilaterally    Lungs: Respirations are equal and unlabored    Gait is coordinated.    Cardiovascular: There are no swelling or varicosities present.    Lymphatic: Negative for adenopathy    Skin: Normal    Neurological: Level of consciousness normal. Oriented to place person and time and situation    Psychiatric: Oriented to time place person and situation    Tender right side thoracolumbar junction paraspinous muscles and also bilateral paraspinous muscles lumbosacral junction mild restriction of motion neurovascular intact  XRAY Report/ Interpretation:  Prior x-rays reviewed      Assessment:       1. Scoliosis of thoracolumbar spine, unspecified scoliosis type    2. Degenerative disc disease, lumbar    3. Facet arthritis of lumbar region        Plan:       Iam Knutson was seen today for pain.    Diagnoses and all orders for this visit:    Scoliosis of thoracolumbar spine, unspecified scoliosis type  -     Ambulatory referral/consult to Pain Clinic; Future    Degenerative disc disease, lumbar  -     Ambulatory referral/consult to Pain Clinic; Future    Facet arthritis of lumbar region  -     Ambulatory referral/consult to Pain Clinic; Future         Follow up for 2 month f/u - lumbar pain, PM follow up.    Patient appears has some continued axial back pain at thoracolumbar junction also the lumbosacral junction no evidence of radiculopathy chronic pain has been present for 25 years.  Referred to pain management see if there is any thing that can be offered to her such as medial branch blocks or other modalities.  Not a surgical candidate.      Treatment options were discussed with regards to the nature of the medical condition. Conservative pain intervention and  surgical options were discussed in detail. The probability of success of each separate treatment option was discussed. The patient expressed a clear understanding of the treatment options. With regards to surgery, the procedure risk, benefits, complications, and outcomes were discussed. No guarantees were given with regards to surgical outcome.   The risk of complications, morbidity, and mortality of patient management decisions have been made at the time of this visit. These are associated with the patient's problems, diagnostic procedures and treatment options. This includes the possible management options selected and those considered but not selected by the patient after shared medical decision making we discussed with the patient.     This note was created using Dragon voice recognition software that occasionally misinterpreted phrases or words.

## 2024-09-10 ENCOUNTER — CLINICAL SUPPORT (OUTPATIENT)
Dept: REHABILITATION | Facility: HOSPITAL | Age: 49
End: 2024-09-10
Payer: MEDICAID

## 2024-09-10 DIAGNOSIS — R53.1 STRENGTH LOSS OF: ICD-10-CM

## 2024-09-10 DIAGNOSIS — R29.3 POSTURE ABNORMALITY: Primary | ICD-10-CM

## 2024-09-10 PROCEDURE — 97110 THERAPEUTIC EXERCISES: CPT | Mod: PN

## 2024-09-10 NOTE — PROGRESS NOTES
[facility]   Physical Therapy Treatment Note     Name: Iam Metz  Clinic Number: [id number]    Therapy Diagnosis:   Encounter Diagnoses   Name Primary?    Posture abnormality Yes    Strength loss of      Physician: Dharmesh Sutton MD    Visit Date: 9/10/2024    Physician Orders: PT  Eval and Treat  Medical Diagnosis from Referral: [id number] (ICD-10-CM) - Scoliosis of thoracolumbar spine, unspecified scoliosis typeM51.36 (ICD-10-CM) - Degenerative disc disease, [id number] (ICD-10-CM) - Facet arthritis of lumbar region  Evaluation Date: 2024  Authorization Period Expiration: 2024   Plan of Care Expiration: 10/15/2024  Progress Note Due: 2024  Visit # / Visits authorized:  (  HB)   FOTO: 1/ 3      FOTO 1st: 45%  Predicted Score: 56%  FOTO 3rd:  FOTO 10th:    Time in: 902 AM  Time out: 1004 AM  Total Billable Time: 62 minutes      Precautions:  Standard     SUBJECTIVE     Pt reports:her back has been feeling good but she did have a lot of pain the morning after running errands. She did her standing extensions which helped when she was having this pain.     She was compliant with home exercise program.  Response to previous treatment: ongoing  Functional change: ongoing    Pain: 2/10  Location: mid back      OBJECTIVE     Objective Measures updated at progress report unless specified.     Baseline IM Testing Results:   Date of testin2024  ROM 0-54 deg   Max Peak Torque 101    Min Peak Torque 16    Flex/Ext Ratio 6.3   % below normative data 65%     FOTO: 45%    Treatment     Iam Knutson received the treatments listed below:      ++All charges filed per Medicaid guidelines++    PTA assisted with treatment under direct supervision of treating therapist. Patient received 1:1 treatments for 40 minutes with Physical Therapist.     Therapeutic exercises to develop strength, endurance, ROM, flexibility, posture, and core stabilization for 57 minutes  including: participation in the Medical MedX and Peripheral muscle strengthening on the Precor machines.      Treadmill x 4 minutes @ 2.0 mph     Prone press ups x 10 reps sag  Prone hip extension 2 x 10 reps   Bridging + march 10 repetitions each side   + quadruped thoracic rotations 15 repetitions each side  + bird/dog 2 x 5 repetitions each side   Sidelying external rotation  clams green theraband 20 repetitions each side     Medx:         9/10/2024    10:03 AM   [facility]   Visit Number 3   VAS Pain Rating 0   Lumbar Weight 52 lbs   Repetitions 18   Rating of Perceived Exertion 6     Patient Education and Home Exercises     Home Exercises Provided and Patient Education Provided     Education provided:   - Home Exercise Program     Written Home Exercises Provided: Patient instructed to cont prior HEP. Exercises were reviewed and Iam Knutson was able to demonstrate them prior to the end of the session.  Iam Knutson demonstrated good  understanding of the education provided. See EMR under Patient Instructions for exercises provided during therapy sessions    ASSESSMENT     Arrives without back pain. She reports the repeated extensions help with her pain. She continues to get her pain at same intensity but is able to better manage her pain with the extensions. She reports usually prone hip extension bothers her back but this was not present today. Prone press ups performed requiring verbal cues to encourage reaching her end range with this. Also added sag today with good tolerance. Progressed a few mat exercises today with good form and tolerance. Medx increased by 5% performing 20 repetitions at 6/10 RPE. She tolerated peripheral machines well.     Iam Knutson is progressing well towards her goals.     Pt prognosis is Fair.     Pt will continue to benefit from skilled outpatient physical therapy to address the deficits listed in the problem list box on initial evaluation, provide pt/family education and to maximize  pt's level of independence in the home and community environment.     Pt's spiritual, cultural and educational needs considered and pt agreeable to plan of care and goals.     Anticipated barriers to physical therapy:chronic pain, scoliosis     GOALS: Pt is in agreement with the following goals.    Short term goals:  6 weeks or 10 visits   - Pt will demonstrate increased lumbar MedX ROM by at least 3 degrees from the initial ROM value with improvements noted in functional ROM and ability to perform ADLs. Appropriate and Ongoing  - Pt will demonstrate increased MedX average isometric strength value by 15% from initial test resulting in improved ability to perform bending, lifting, and carrying activities safely, confidently. Appropriate and Ongoing  - Pt will report a reduction in worst pain score by 1-2 points for improved tolerance for standing and walking. Appropriate and Ongoing  - Pt able to perform HEP correctly with minimal cueing or supervision from therapist to encourage independent management of symptoms. Appropriate and Ongoing    Long term goals: 10 weeks or 20 visits   - Pt will demonstrate increased lumbar MedX ROM by at least 6 degrees from initial ROM value, resulting in improved ability to perform functional forward bending while standing and sitting. Appropriate and Ongoing  - Pt will demonstrate increased MedX average isometric strength value by 30% from initial test resulting in improved ability to perform bending, lifting, and carrying activities safely and confidently. Appropriate and Ongoing  - Pt to demonstrate ability to independently control and reduce their pain through posture positioning and mechanical movements throughout a typical day. Appropriate and Ongoing  - Pt will demonstrate reduced pain and improved functional outcomes as reported on the FOTO by reaching an intake score of >/= 45% functional ability in order to demonstrate subjective improvement in patient's condition. .  Appropriate and Ongoing  - Pt will demonstrate independence with the HEP at discharge. Appropriate and Ongoing  - Patient will report >/= 50 % improvement in R back pain since evaluation to improve quality of life and functional mobility Appropriate and Ongoing    PLAN     Continue POC as indicated. Progress as tolerated.     Elissa Vizcarra, PT

## 2024-09-12 ENCOUNTER — CLINICAL SUPPORT (OUTPATIENT)
Dept: REHABILITATION | Facility: HOSPITAL | Age: 49
End: 2024-09-12
Payer: MEDICAID

## 2024-09-12 DIAGNOSIS — R29.3 POSTURE ABNORMALITY: Primary | ICD-10-CM

## 2024-09-12 DIAGNOSIS — R53.1 STRENGTH LOSS OF: ICD-10-CM

## 2024-09-12 PROCEDURE — 97110 THERAPEUTIC EXERCISES: CPT | Mod: PN,CQ
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
## 2024-09-13 ENCOUNTER — DOCUMENTATION ONLY (OUTPATIENT)
Dept: REHABILITATION | Facility: HOSPITAL | Age: 49
End: 2024-09-13
Payer: MEDICAID

## 2024-09-13 NOTE — PROGRESS NOTES
PT/PTA met face to face to discuss pt's treatment plan and progress towards established goals. Pt will be seen by a physical therapist minimally every 6th visit or every 30 days.        Jaye Stern PTA

## 2024-09-18 NOTE — PROGRESS NOTES
"[facility]   Physical Therapy Treatment Note     [facility]  Clinic Number: [id number]    Therapy Diagnosis:   Encounter Diagnoses   Name Primary?    Posture abnormality Yes    Strength loss of      Physician: Dharmesh Sutton MD    Visit Date: 2024    Physician Orders: PT  Eval and Treat  Medical Diagnosis from Referral: [id number] (ICD-10-CM) - Scoliosis of thoracolumbar spine, unspecified scoliosis typeM51.36 (ICD-10-CM) - Degenerative disc disease, [id number] (ICD-10-CM) - Facet arthritis of lumbar region  Evaluation Date: 2024  Authorization Period Expiration: 2024   Plan of Care Expiration: 10/15/2024  Progress Note Due: 2024  Visit # / Visits authorized:  (  HB)   FOTO: 1/ 3      FOTO 1st: 45%  Predicted Score: 56%  FOTO 3rd:  FOTO 10th:    Time in: 1000  Time out: 1100  Total Billable Time: 60 minutes      Precautions:  Standard     SUBJECTIVE     Pt reports: she is not feeling well today.  She is having some discomfort in her back but it is worse when she stands a few minutes. Pt statex she has still been having issues with her heart racing.  She states she spoke to her psychologist and he feels she is having "a breakdown" (anxiety)       She was compliant with home exercise program.  Response to previous treatment: ongoing  Functional change: ongoing    Pain:  0/10  Location: mid back      OBJECTIVE     Objective Measures updated at progress report unless specified.     Baseline IM Testing Results:   Date of testin2024  ROM 0-54 deg   Max Peak Torque 101    Min Peak Torque 16    Flex/Ext Ratio 6.3   % below normative data 65%     FOTO: 45%    Treatment     Iam Knutson received the treatments listed below:      ++All charges filed per Medicaid guidelines++    Therapeutic exercises to develop strength, endurance, ROM, flexibility, posture, and core stabilization for 60 minutes including: participation in the Medical MedX and " Peripheral muscle strengthening on the Precor machines.         9/19/2024    11:30 AM   [facility]   Visit Number 6   VAS Pain Rating 2   Treadmill Time (in min.) 5 min   Speed 2.3 mph   Flexion in Lying 20   Lumbar Weight 52 lbs   Repetitions 20   Rating of Perceived Exertion 8         Treadmill x 5 minutes @ 2.3 mph     Prone press ups x 10 reps sag  Prone hip extension 2 x 10 reps   quadruped thoracic rotations x 10 repetitions each side   bird/dog x 10 repetitions each side   Bridging + march 10 repetitions each side (better control)  Sidelying external rotation Green Theraband x 20 reps    SIdelying open books x 10 repetitions each side       Patient Education and Home Exercises     Home Exercises Provided and Patient Education Provided     Education provided:   - Home Exercise Program     Written Home Exercises Provided: Patient instructed to cont prior HEP. Exercises were reviewed and Iam Knutson was able to demonstrate them prior to the end of the session.  Iam Knutson demonstrated good  understanding of the education provided. See EMR under Patient Instructions for exercises provided during therapy sessions    ASSESSMENT     Iam Knutson presents to PT reporting minimal pain today.  She reports she is still having issues with heart racing and sweaty which her doctor contributes to anxiety and beginning stages of menopause.  She continues to rate the Precor machines high on her exertion scale.    Iam Knutson is progressing well towards her goals.     Pt prognosis is Fair.     Pt will continue to benefit from skilled outpatient physical therapy to address the deficits listed in the problem list box on initial evaluation, provide pt/family education and to maximize pt's level of independence in the home and community environment.     Pt's spiritual, cultural and educational needs considered and pt agreeable to plan of care and goals.     Anticipated barriers to physical therapy:chronic pain, scoliosis     GOALS: Pt is  in agreement with the following goals.    Short term goals:  6 weeks or 10 visits   - Pt will demonstrate increased lumbar MedX ROM by at least 3 degrees from the initial ROM value with improvements noted in functional ROM and ability to perform ADLs. Appropriate and Ongoing  - Pt will demonstrate increased MedX average isometric strength value by 15% from initial test resulting in improved ability to perform bending, lifting, and carrying activities safely, confidently. Appropriate and Ongoing  - Pt will report a reduction in worst pain score by 1-2 points for improved tolerance for standing and walking. Appropriate and Ongoing  - Pt able to perform HEP correctly with minimal cueing or supervision from therapist to encourage independent management of symptoms. Appropriate and Ongoing    Long term goals: 10 weeks or 20 visits   - Pt will demonstrate increased lumbar MedX ROM by at least 6 degrees from initial ROM value, resulting in improved ability to perform functional forward bending while standing and sitting. Appropriate and Ongoing  - Pt will demonstrate increased MedX average isometric strength value by 30% from initial test resulting in improved ability to perform bending, lifting, and carrying activities safely and confidently. Appropriate and Ongoing  - Pt to demonstrate ability to independently control and reduce their pain through posture positioning and mechanical movements throughout a typical day. Appropriate and Ongoing  - Pt will demonstrate reduced pain and improved functional outcomes as reported on the FOTO by reaching an intake score of >/= 45% functional ability in order to demonstrate subjective improvement in patient's condition. . Appropriate and Ongoing  - Pt will demonstrate independence with the HEP at discharge. Appropriate and Ongoing  - Patient will report >/= 50 % improvement in R back pain since evaluation to improve quality of life and functional mobility Appropriate and  Ongoing    PLAN     Continue POC as indicated. Progress as tolerated.     Jaye Stern, PTA

## 2024-09-19 ENCOUNTER — CLINICAL SUPPORT (OUTPATIENT)
Dept: REHABILITATION | Facility: HOSPITAL | Age: 49
End: 2024-09-19
Payer: MEDICAID

## 2024-09-19 DIAGNOSIS — R29.3 POSTURE ABNORMALITY: Primary | ICD-10-CM

## 2024-09-19 DIAGNOSIS — R53.1 STRENGTH LOSS OF: ICD-10-CM

## 2024-09-19 PROCEDURE — 97110 THERAPEUTIC EXERCISES: CPT | Mod: PN,CQ

## 2025-03-13 ENCOUNTER — PATIENT OUTREACH (OUTPATIENT)
Dept: ADMINISTRATIVE | Facility: HOSPITAL | Age: 50
End: 2025-03-13
Payer: MEDICAID

## 2025-03-13 ENCOUNTER — PATIENT MESSAGE (OUTPATIENT)
Dept: ADMINISTRATIVE | Facility: HOSPITAL | Age: 50
End: 2025-03-13
Payer: MEDICAID

## 2025-03-13 NOTE — PROGRESS NOTES
Population Health Chart Review & Patient Outreach Details    Outreach Performed: YES Portal Active and/or Letter inactive    Additional [facility] Health Notes:    BREAST CANCER SCREENING    Non-compliant report chart audits for BREAST CANCER SCREENING     Outreach to patient in reference to SCHEDULING A MAMMOGRAM EXAM.            Updates Requested / Reviewed:               Health Maintenance Topics Overdue:      VBHM Score: 2     Cervical Cancer Screening  Mammogram

## 2025-04-02 ENCOUNTER — TELEPHONE (OUTPATIENT)
Dept: PAIN MEDICINE | Facility: CLINIC | Age: 50
End: 2025-04-02
Payer: MEDICAID

## 2025-04-03 ENCOUNTER — OFFICE VISIT (OUTPATIENT)
Dept: PAIN MEDICINE | Facility: CLINIC | Age: 50
End: 2025-04-03
Payer: MEDICAID

## 2025-04-03 VITALS
HEIGHT: 72 IN | WEIGHT: 166 LBS | HEART RATE: 96 BPM | OXYGEN SATURATION: 93 % | DIASTOLIC BLOOD PRESSURE: 82 MMHG | BODY MASS INDEX: 22.48 KG/M2 | SYSTOLIC BLOOD PRESSURE: 110 MMHG

## 2025-04-03 DIAGNOSIS — M51.360 DEGENERATION OF INTERVERTEBRAL DISC OF LUMBAR REGION WITH DISCOGENIC BACK PAIN: Primary | ICD-10-CM

## 2025-04-03 DIAGNOSIS — M47.816 FACET ARTHRITIS OF LUMBAR REGION: ICD-10-CM

## 2025-04-03 DIAGNOSIS — M54.50 CHRONIC BILATERAL LOW BACK PAIN WITHOUT SCIATICA: ICD-10-CM

## 2025-04-03 DIAGNOSIS — G89.29 CHRONIC BILATERAL LOW BACK PAIN WITHOUT SCIATICA: ICD-10-CM

## 2025-04-03 DIAGNOSIS — M41.9 SCOLIOSIS OF THORACOLUMBAR SPINE, UNSPECIFIED SCOLIOSIS TYPE: ICD-10-CM

## 2025-04-03 DIAGNOSIS — M54.9 DORSALGIA, UNSPECIFIED: ICD-10-CM

## 2025-04-03 PROCEDURE — 99999 PR PBB SHADOW E&M-EST. PATIENT-LVL IV: CPT | Mod: PBBFAC,,, | Performed by: ANESTHESIOLOGY

## 2025-04-03 PROCEDURE — 99214 OFFICE O/P EST MOD 30 MIN: CPT | Mod: PBBFAC | Performed by: ANESTHESIOLOGY

## 2025-04-03 NOTE — PROGRESS NOTES
New Patient Evaluation  [facility]    Iam Metz  : 1975  Date: 4/3/2025     CHIEF COMPLAINT:  Cervical Spine Pain (C-spine) and Low-back Pain  Referring Physician: Dharmesh Sutton MD  Primary Care Physician: Lizandro Stephen DO    HPI:  This is a 49 y.o. female with a chief complaint of Cervical Spine Pain (C-spine) and Low-back Pain  . The patient has Past medical history/Past surgical history of  anxiety, depression    Patient was evaluated and referred by  orthopedic provider for low back pain, as patient has no surgical indication, patient had a recommendation for pain management referral and possible possible medial branch block.    Diabetic: No    Anticoagulation medications: None    Allergy To Iodine: No    Currently on Antibiotic: No    Pain Disability Index Review:      4/3/2025    11:17 AM   Last 3 PDI Scores   Pain Disability Index (PDI) 26     Current Description of Pain Symptoms:    History of Recent Fall or Trauma: No   Onset: Chronic, started on going  Pain Location:  low back pain  Radiates/associated symptoms:  upper back   Pain is Getting worse over the last 3 months   The pain is continuous.   The pain is described as aching, crushing, stabbing, and throbbing.   Exacerbating factors: Sitting, Standing, Walking, and Lifting.   Mitigating factors ice.   Symptoms interfere with daily activity, sleeping, and work  The patient feels like symptoms have been worsening.   Patient denies night fever/night sweats, urinary incontinence, bowel incontinence, significant weight loss, significant motor weakness, and loss of sensations.    Pain score:  Current: 2/10  Best: 2/10  Worst: 9/10  Current pain medications:  N/A    Current Narcotics/Opioid /benzo Medications:  Opioids- None  Benzodiazepines: No    UDS:  NA    PDMP:  Reviewed and consistent with medication use as prescribed.     Previous Chronic Pain Treatment History:  Six weeks of conservative therapy  "include: Physical Therapy/HEP/Physician Lead Exercise Program:  Over the past 12 months, Patient has done  9 sessions.  PT response: Not Helpful.   Dates of the PT sessions: 9-10/2024.  Is patient actively participating in home exercise program (HEP)/ physician led exercise program in the last 6 months: Yes.    Non-interventional Pain Therapy:  []Chiropractor.   []Acupuncture/Dry needle.  []TENS unit.  [x]Heat/ICE.  []Back Brace.    Medications previously tried:  NSAIDs: OTC, Ibuprofen (Advil/Motrin), Naproxen (Naprosyn), and Diclofenac  Topical Agent: Yes  TCA/SSRI/SNRI: SNRI: Venlafaxine (Effexor)   Anti-convulsants: NA  Muscle Relaxants: Tizanidine (Zanaflex)  Opioids- None.    Interventional Pain Procedures:  N/A    Previous spine/Relevant joint surgery:  N/A  Surgical History:   has a past surgical history that includes breast implants and arm surgery.  Medical History:   has a past medical history of Anxiety, Depression, and Scoliosis.  Family History:  family history includes Alzheimer's disease in her father; Atrial fibrillation in her mother; Depression in her father; Hypertension in her mother.  Allergies:  Patient has no known allergies.   Social History/SUBSTANCE ABUSE HISTORY:  Personal history of substance abuse: No   reports that she has been smoking cigarettes. She has never used smokeless tobacco.  LABS:  CBC  Lab Results   Component Value Date    WBC 7.33 04/09/2024    HGB 14.3 04/09/2024    HCT 45.3 04/09/2024     Coagulation Profile   Lab Results   Component Value Date     04/09/2024     No results found for: "PT", "PTT", "INR"  CMP:  BMP  Lab Results   Component Value Date     04/09/2024    K 4.7 04/09/2024     04/09/2024    CO2 27 04/09/2024    BUN 14 04/09/2024    CREATININE 0.8 04/09/2024    CALCIUM 10.1 04/09/2024    ANIONGAP 12 04/09/2024    EGFRNORACEVR >60.0 04/09/2024     Lab Results   Component Value Date    ALT 20 04/09/2024    AST 21 04/09/2024    ALKPHOS 75 " 04/09/2024    BILITOT 0.3 04/09/2024     HGBA1C:  Lab Results   Component Value Date    HGBA1C 5.4 04/09/2024       ROS:    Review of Systems   GENERAL:  No weight loss, malaise or fevers.  HEENT:   No recent changes in vision or hearing  NECK:  Negative for lumps, no difficulty with swallowing.  RESPIRATORY:  Negative for cough, wheezing or shortness of breath, patient denies any recent URI.  CARDIOVASCULAR:  Negative for chest pain or palpitations.  GI:  Negative for abdominal discomfort, blood in stools or black stools or change in bowel habits.  MUSCULOSKELETAL:  See HPI.  SKIN:  Negative for lesions, rash, and itching.  PSYCH:  No mood disorder or recent psychosocial stressors.   HEMATOLOGY/LYMPHOLOGY:  See the blood thinner sectioned in HPI.  NEURO:  See HPI  All other reviewed and negative other than HPI.    PHYSICAL EXAM:  VITALS: /82 (BP Location: Right arm, Patient Position: Sitting)   Pulse 96   Ht 6' (1.829 m)   Wt 75.3 kg (166 lb)   SpO2 (!) 93%   BMI 22.51 kg/m²   Body mass index is 22.51 kg/m².  GENERAL: Well appearing, in no acute distress, alert and oriented x3, answers questions appropriately.   PSYCH: Flat affect.  SKIN: Skin color, texture, turgor normal, no rashes or lesions.  HEAD/FACE:  Normocephalic, atraumatic. Cranial nerves grossly intact.  CV: Regular rate  PULM: No evidence of respiratory difficulty, symmetric chest rise.  GI:  Soft and non-Distended.  BACK/SIJ/HIP:  Lumbar Spine Exam:       Inspection: No erythema, bruising.       Palpation: (+++) TTP of lumbar paraspinals bilaterally      ROM:  Limited in flexion, extension, lateral bending.       (+++) Facet loading bilateral      (Negative) Straight Leg Raise, bilateral      (++) DANNA, Tenderness over the PSIS, Yeoman test, bilateral  Hip Exam:      Inspection: No gross deformity or apparent leg length discrepancy      Palpation:  No TTP to bilateral greater trochanteric bursas.       ROM:  no limitation Due to pain in  internal rotation, external rotation b/l  Neurologic Exam:     Alert. Speech is fluent and appropriate.     Strength: 5/5 in bilateral hip flexion and knee extension     Sensation:  Grossly intact to light touch in bilateral lower extremities     Tone: No abnormality appreciated in bilateral lower extremities    GAIT:  normal gait    DIAGNOSTIC STUDIES AND MEDICAL RECORDS REVIEW:  I have personally reviewed and interpreted relevant radiology reports and reviewed relevant records from other services in the EMR.   Ct lumbar spine 04/2024  Thin section noncontrast imaging is performed and viewed in multiple planes.     There is S-shaped scoliotic curvature of the lower thoracic and lumbar spine.  Rightward convexity is seen near the thoracolumbar junction with leftward convexity in the remainder of the lumbar spine.     The vertebral bodies are appropriately maintained in height.  Alignment is satisfactory.  Mild degrees of disc space narrowing occur towards the left of midline at the L2 and L3 disc levels with more significant disc space narrowing occurring predominantly towards the right of midline at L4-5 and L5-S1.     At T12-L1, there is no significant disc bulging or focal soft disc herniation.  The central canal and foramina are patent.     At L1-L2, minimal bulging of the disc margin occurs towards the left of midline.  There is no significant encroachment of the central canal or foramina.     At L2-3, shallow bulging of the disc margin results in mild diffuse mass effect upon the thecal sac.  There are mild facet degenerative changes.  The central canal and foramina are not significantly narrowed.     At L3-4, broad-based bulging of the disc margin results in diffuse mass effect upon the thecal sac.  In combination with facet arthropathy there is mild central spinal canal narrowing.  The foramina are patent.     At L4-5, mild-moderate central canal narrowing results from shallow broad-based bulging of the disc  margin and posterior osteophytic ridging which are asymmetric towards the right of midline.  There are mild bilateral facet degenerative changes.  There is mild right foraminal narrowing.     At L5-S1, mild-moderate central canal encroachment results from shallow broad-based disc bulging, posterior osteophytic ridging and bilateral facet degenerative changes.  There is mild bilateral foraminal narrowing.  Clinical Impression:  This is a pleasant 49 y.o. female patient with PMH/PSH of  anxiety, depression, presenting with low back pain, mainly axial in nature,  patient was referred from Neurosurgery as she was not a surgical candidate, she has completed multiple sessions of physical therapy with limited benefit, I would recommend obtaining MRI lumbar spine    Encounter Diagnosis:  Iam Metz is a 49 y.o. female with the following diagnoses based on history, exam, and imagin. Degenerative disc disease, lumbar  - Ambulatory referral/consult to Pain Clinic    2. Facet arthritis of lumbar region  - Ambulatory referral/consult to Pain Clinic  - X-Ray Lumbar Complete Including Flex And Ext; Future    3. Dorsalgia, unspecified  - MRI Lumbar Spine Without Contrast; Future    4. Scoliosis of thoracolumbar spine, unspecified scoliosis type  - Ambulatory referral/consult to Pain Clinic    5. Chronic bilateral low back pain without sciatica     Treatment Plan:    Diagnostics/Referrals: MRI lumbar spine+  x-ray lumbar spine    Medications:    NSAIDs: OTC  Topical Agent: No  TCA/SSRI/SNRI: None  Anti-convulsants: None  Muscle Relaxants: None  Opioids: None    Interventional Therapy:  may consider lumbar medial branch block.  Sedation: Oral Sedation.  Anticoagulation medications: None -Clearance to stop Blood thinner: NA    Regarding the above interventions, the patient has been educated regarding the risks (including bleeding, infection, increased pain, nerve damage, or allergic reaction), benefits, and alternatives. The  patient states she understands and is eager to proceed.    Physical Rehabilitation: Physician led exercise program and home exercise    Patient Education: Counseled patient regarding the importance of activity modification, I have stressed the importance of physical activity and a home exercise plan to help with pain and improve health.    Follow-up: RTC  to discuss MRI lumbar spine.    May consider: MBB    I would like to thank Dharmesh Sutton MD for the opportunity to assist in the care of this patient. We had a very nice visit and I look forward to continuing their care. Please let me know if I can be of further assistance.     Alok Lowery MD  Anesthesiologist  Interventional Pain Medicine  04/03/2025    Disclaimer:  This note was prepared using voice recognition system and is likely to have sound alike errors that may have been overlooked even after proof reading.  Please call me with any questions.

## 2025-04-03 NOTE — PROGRESS NOTES
New Patient Evaluation  [facility]    Iam Metz  : 1975  Date: 4/3/2025     CHIEF COMPLAINT:  No chief complaint on file.    Referring Physician: Dharmesh Sutton MD  Primary Care Physician: Lizandro Stephen DO    HPI:  This is a 49 y.o. female with a chief complaint of No chief complaint on file.  . The patient has Past medical history/Past surgical history of  anxiety, depression    Patient was evaluated and referred by  orthopedic provider for low back pain, as patient has no surgical indication, patient had a recommendation possible medial branch block    Diabetic: {GAYes/No/NA:23313}    {Anticoagulation medications:78862}    Allergy To Iodine: {GAYes/No/NA:05584}    Currently on Antibiotic: {GAYes/No/NA:35445}    Pain Disability Index Review:       No data to display                Current Description of Pain Symptoms:    History of Recent Fall or Trauma: {GAYes/No/NA:52354}   Onset: Chronic, started ***  Pain Location: ***  Radiates/associated symptoms: ***.   Pain is Getting worse over the last *** months   The pain is {Intermittent/Continuous:16445}.   The pain is described as {Desc; pain character:31884}.   Exacerbating factors: {Causes; Pain:91730}.   Mitigating factors ***.   Symptoms interfere with daily activity, sleeping, and ***.   The patient feels like symptoms have been {IUW:88662}.   Patient {Denies / Reports:18547} {RED FLAGS:95751}.    Pain score:   Current: {PAIN 0-10:70959}/10  Best: {PAIN 0-10:45703}/10  Worst: {PAIN 0-10:11594}/10    Current pain medications:  ***    Current Narcotics/Opioid /benzo Medications:  Opioids- {GAopioid:38745}  Benzodiazepines: {GAYes/No/NA:55322}    UDS:  NA    PDMP:  {:83174}     Previous Chronic Pain Treatment History:  Six weeks of conservative therapy include: Physical Therapy/HEP/Physician Lead Exercise Program:  Over the past 12 months, Patient has done  *** sessions.  PT response: {PT response:50294} Helpful.  "  Dates of the PT sessions: ***, ***.  Is patient actively participating in home exercise program (HEP)/ physician led exercise program in the last 6 months: {GAYes/No/NA:80381}.    Non-interventional Pain Therapy:  []Chiropractor.   []Acupuncture/Dry needle.  []TENS unit.  []Heat/ICE.  []Back Brace.    Medications previously tried:  NSAIDs: {GANSIAD:17045}  Topical Agent: {GAYes/No/NA:34882}  TCA/SSRI/SNRI: {GATCA/SSRI/SNRI:13498}  Anti-convulsants: {GAAnticonvulsants:30651}  Muscle Relaxants: {GAmuscle Relaxant:37100}  Opioids- {GAopioid:09840}.    Interventional Pain Procedures:  ***    Previous spine/Relevant joint surgery:  ***  Surgical History:   has a past surgical history that includes breast implants and arm surgery.  Medical History:   has a past medical history of Anxiety, Depression, and Scoliosis.  Family History:  family history includes Alzheimer's disease in her father; Atrial fibrillation in her mother; Depression in her father; Hypertension in her mother.  Allergies:  Patient has no known allergies.   Social History/SUBSTANCE ABUSE HISTORY:  Personal history of substance abuse: No   reports that she has been smoking cigarettes. She has never used smokeless tobacco.  LABS:  CBC  Lab Results   Component Value Date    WBC 7.33 04/09/2024    HGB 14.3 04/09/2024    HCT 45.3 04/09/2024     Coagulation Profile   Lab Results   Component Value Date     04/09/2024     No results found for: "PT", "PTT", "INR"  CMP:  BMP  Lab Results   Component Value Date     04/09/2024    K 4.7 04/09/2024     04/09/2024    CO2 27 04/09/2024    BUN 14 04/09/2024    CREATININE 0.8 04/09/2024    CALCIUM 10.1 04/09/2024    ANIONGAP 12 04/09/2024    EGFRNORACEVR >60.0 04/09/2024     Lab Results   Component Value Date    ALT 20 04/09/2024    AST 21 04/09/2024    ALKPHOS 75 04/09/2024    BILITOT 0.3 04/09/2024     HGBA1C:  Lab Results   Component Value Date    HGBA1C 5.4 04/09/2024       ROS:    Review of " Systems   GENERAL:  No weight loss, malaise or fevers.  HEENT:   No recent changes in vision or hearing  NECK:  Negative for lumps, no difficulty with swallowing.  RESPIRATORY:  Negative for cough, wheezing or shortness of breath, patient denies any recent URI.  CARDIOVASCULAR:  Negative for chest pain or palpitations.  GI:  Negative for abdominal discomfort, blood in stools or black stools or change in bowel habits.  MUSCULOSKELETAL:  See HPI.  SKIN:  Negative for lesions, rash, and itching.  PSYCH:  No mood disorder or recent psychosocial stressors.   HEMATOLOGY/LYMPHOLOGY:  See the blood thinner sectioned in HPI.  NEURO:  See HPI  All other reviewed and negative other than HPI.    PHYSICAL EXAM:  VITALS: There were no vitals taken for this visit.  There is no height or weight on file to calculate BMI.  GENERAL: Well appearing, in no acute distress, alert and oriented x3, answers questions appropriately.   PSYCH: Flat affect.  SKIN: Skin color, texture, turgor normal, no rashes or lesions.  HEAD/FACE:  Normocephalic, atraumatic. Cranial nerves grossly intact.  CV: Regular rate  PULM: No evidence of respiratory difficulty, symmetric chest rise.  GI:  Soft and non-Distended.  NECK: ({GA+:22779}) pain to palpation over the cervical paraspinous muscles. Spurling:{GA+:99545}. ({GA+:61378}) pain with neck flexion, extension, or lateral flexion, Muscle strength in RT UE ***/5 and Left UE ***/5, Right Hand  ***/5, Left Hand ***/5  BACK/SIJ/HIP:  Lumbar Spine Exam:       Inspection: No erythema, bruising.       Palpation: ({GA+:95123}) TTP of lumbar paraspinals bilaterally      ROM:  Limited in flexion, extension, lateral bending.       ({GA+:11057}) Facet loading {GAHip:37214}      ({GA+:86304}) Straight Leg Raise, {GAHip:32496}      ({GA+:75814}) DANNA, Tenderness over the PSIS, Yeoman test, {GAHip:60313}  Hip Exam:      Inspection: No gross deformity or apparent leg length discrepancy      Palpation:  No TTP to  "bilateral greater trochanteric bursas.       ROM:  *** limitation Due to pain in internal rotation, external rotation b/l  Neurologic Exam:     Alert. Speech is fluent and appropriate.     Strength: ***/5 in {GAHip:24842} hip flexion and knee extension     Sensation:  Grossly intact to light touch in bilateral lower extremities     Tone: No abnormality appreciated in bilateral lower extremities  Knee exam:  No gross deformity or apparent leg length discrepancy, positive tenderness over the anteromedial aspect of the knee cap, positive limitation due to pain in flexion and extension, sensation  grossly intact to light touch in bilateral lower extremity, no atrophy or tone abnormalities    GAIT: {GAgait:36087}    DIAGNOSTIC STUDIES AND MEDICAL RECORDS REVIEW:  I have personally reviewed and interpreted relevant radiology reports and reviewed relevant records from other services in the EMR.   ***  Clinical Impression:  This is a pleasant 49 y.o. female patient with PMH/PSH of ***, presenting with***.     We discussed the underlying diagnoses and multiple treatment options including non-opioid medications, interventional procedures, physical therapy, home exercise, core muscle enhancement, and weight loss.  The risks and benefits of each treatment option were discussed and all questions were answered.      Encounter Diagnosis:  Iam Metz is a 49 y.o. female with the following diagnoses based on history, exam, and imaging:  There are no diagnoses linked to this encounter.     Treatment Plan:    Diagnostics/Referrals: {gaimage:23180}    Medications:    NSAIDs: {GANSIAD:77087}  Topical Agent: {GAYes/No/NA:55233}  TCA/SSRI/SNRI: {GATCA/SSRI/SNRI:94086}  Anti-convulsants: {GAAnticonvulsants:19473}  Muscle Relaxants: {GAmuscle Relaxant:39220}  Opioids: {GAopioid:46972::"None"}  Patient was educated about the risk and benefit of chronic opioid therapy including dependency, addiction, diversion, and opioid " hyperalgesia.  Interventional Therapy: {GAProcedure:25783}.  Sedation: {GAsedation:97925}.  {Anticoagulation medications:13573} -Clearance to stop Blood thinner: {GAYes/No/NA:12581}    Regarding the above interventions, the patient has been educated regarding the risks (including bleeding, infection, increased pain, nerve damage, or allergic reaction), benefits, and alternatives. The patient states she understands and is eager to proceed.    Physical Rehabilitation: {GAPT:16247}    Patient Education: Counseled patient regarding the importance of {:46595}, I have stressed the importance of physical activity and a home exercise plan to help with pain and improve health.    Follow-up: RTC ***.    May consider:     I would like to thank Dharmesh Sutton MD for the opportunity to assist in the care of this patient. We had a very nice visit and I look forward to continuing their care. Please let me know if I can be of further assistance.     Alok Lowery MD  Anesthesiologist  Interventional Pain Medicine  04/03/2025    Disclaimer:  This note was prepared using voice recognition system and is likely to have sound alike errors that may have been overlooked even after proof reading.  Please call me with any questions.

## 2025-04-11 ENCOUNTER — HOSPITAL ENCOUNTER (OUTPATIENT)
Dept: RADIOLOGY | Facility: HOSPITAL | Age: 50
Discharge: HOME | End: 2025-04-11
Attending: ANESTHESIOLOGY
Payer: MEDICAID

## 2025-04-11 ENCOUNTER — PATIENT OUTREACH (OUTPATIENT)
Dept: ADMINISTRATIVE | Facility: HOSPITAL | Age: 50
End: 2025-04-11
Payer: MEDICAID

## 2025-04-11 DIAGNOSIS — M47.816 FACET ARTHRITIS OF LUMBAR REGION: ICD-10-CM

## 2025-04-11 DIAGNOSIS — M54.9 DORSALGIA, UNSPECIFIED: ICD-10-CM

## 2025-04-11 PROCEDURE — 72148 MRI LUMBAR SPINE W/O DYE: CPT | Mod: 26,,, | Performed by: RADIOLOGY

## 2025-04-11 PROCEDURE — 72148 MRI LUMBAR SPINE W/O DYE: CPT | Mod: TC,PO

## 2025-04-11 PROCEDURE — 72114 X-RAY EXAM L-S SPINE BENDING: CPT | Mod: TC,PO

## 2025-04-11 PROCEDURE — 72114 X-RAY EXAM L-S SPINE BENDING: CPT | Mod: 26,,, | Performed by: RADIOLOGY

## 2025-04-11 NOTE — PROGRESS NOTES
Population Health Chart Review & Patient Outreach Details      Additional [facility] Health Notes:               Updates Requested / Reviewed:      Care Everywhere, , and External Sources: [facility] and [facility]         Health Maintenance Topics Overdue:      VB Score: 2     Cervical Cancer Screening  Mammogram                       Health Maintenance Topic(s) Outreach Outcomes & Actions Taken:    Cervical Cancer Screening - Outreach Outcomes & Actions Taken  : msg

## 2025-04-17 ENCOUNTER — TELEPHONE (OUTPATIENT)
Dept: PAIN MEDICINE | Facility: CLINIC | Age: 50
End: 2025-04-17
Payer: MEDICAID

## 2025-04-17 ENCOUNTER — OFFICE VISIT (OUTPATIENT)
Dept: PAIN MEDICINE | Facility: CLINIC | Age: 50
End: 2025-04-17
Payer: MEDICAID

## 2025-04-17 VITALS — BODY MASS INDEX: 22.48 KG/M2 | HEIGHT: 72 IN | WEIGHT: 166 LBS

## 2025-04-17 DIAGNOSIS — M47.816 LUMBAR SPONDYLOSIS: Primary | ICD-10-CM

## 2025-04-17 DIAGNOSIS — M51.360 DEGENERATION OF INTERVERTEBRAL DISC OF LUMBAR REGION WITH DISCOGENIC BACK PAIN: ICD-10-CM

## 2025-04-17 DIAGNOSIS — M54.50 CHRONIC BILATERAL LOW BACK PAIN WITHOUT SCIATICA: Primary | ICD-10-CM

## 2025-04-17 DIAGNOSIS — G89.29 CHRONIC BILATERAL LOW BACK PAIN WITHOUT SCIATICA: Primary | ICD-10-CM

## 2025-04-17 DIAGNOSIS — M47.816 LUMBAR FACET ARTHROPATHY: ICD-10-CM

## 2025-04-17 PROCEDURE — 99999 PR PBB SHADOW E&M-EST. PATIENT-LVL II: CPT | Mod: PBBFAC,,, | Performed by: ANESTHESIOLOGY

## 2025-04-17 PROCEDURE — 99212 OFFICE O/P EST SF 10 MIN: CPT | Mod: PBBFAC | Performed by: ANESTHESIOLOGY

## 2025-04-17 NOTE — PROGRESS NOTES
EST Patient Evaluation  [facility]    Iam Metz  : 1975  Date: 2025     CHIEF COMPLAINT:  Back Pain  Referring Physician: No ref. provider found  Primary Care Physician: Lizandro Stephen DO    HPI:  This is a 49 y.o. female with a chief complaint of Back Pain  . The patient has Past medical history/Past surgical history of  anxiety, depression, chronic low back pain    Patient was evaluated and referred by  orthopedic provider for low back pain, as patient has no surgical indication, patient had a recommendation for pain management referral and possible possible medial branch block.    Interval History 2025:  Iam Metz is here for  follow up visit  to discuss MRI lumbar spine that showed at L4-L5 facet arthropathy, right extraforaminal disc osteophyte, at L5-S1 facet arthropathy, right extra foramina disc osteophyte approaching the right extra foramina and L5 nerve root without compression  Current pain score: 7/10    Diabetic: No    Anticoagulation medications: None    Allergy To Iodine: No    Currently on Antibiotic: No    Pain Disability Index Review:      4/3/2025    11:17 AM   Last 3 PDI Scores   Pain Disability Index (PDI) 26     Current Description of Pain Symptoms:    History of Recent Fall or Trauma: No   Onset: Chronic, started on going  Pain Location:  low back pain  Radiates/associated symptoms:  tenderness  Pain is Getting worse over the last 3 months   The pain is continuous.   The pain is described as aching, crushing, stabbing, and throbbing.   Exacerbating factors: Sitting, Standing, Walking, and Lifting.   Mitigating factors ice.   Symptoms interfere with daily activity, sleeping, and work  The patient feels like symptoms have been worsening.   Patient denies night fever/night sweats, urinary incontinence, bowel incontinence, significant weight loss, significant motor weakness, and loss of sensations.    Pain score:  Best: 2/10  Worst:  "9/10  Current pain medications:   ibuprofen    Current Narcotics/Opioid /benzo Medications:  Opioids- None  Benzodiazepines: No    UDS:  NA    PDMP:  Reviewed and consistent with medication use as prescribed.     Previous Chronic Pain Treatment History:  Six weeks of conservative therapy include: Physical Therapy/HEP/Physician Lead Exercise Program:  Over the past 12 months, Patient has done  9 sessions.  PT response: Not Helpful.   Dates of the PT sessions: 9-10/2024.  Is patient actively participating in home exercise program (HEP)/ physician led exercise program in the last 6 months: Yes.    Non-interventional Pain Therapy:  []Chiropractor.   []Acupuncture/Dry needle.  []TENS unit.  [x]Heat/ICE.  []Back Brace.    Medications previously tried:  NSAIDs: OTC, Ibuprofen (Advil/Motrin), Naproxen (Naprosyn), and Diclofenac  Topical Agent: Yes  TCA/SSRI/SNRI: SNRI: Venlafaxine (Effexor)   Anti-convulsants: NA  Muscle Relaxants: Tizanidine (Zanaflex)  Opioids- None.    Interventional Pain Procedures:  N/A    Previous spine/Relevant joint surgery:  N/A  Surgical History:   has a past surgical history that includes breast implants and arm surgery.  Medical History:   has a past medical history of Anxiety, Depression, and Scoliosis.  Family History:  family history includes Alzheimer's disease in her father; Atrial fibrillation in her mother; Depression in her father; Hypertension in her mother.  Allergies:  Patient has no known allergies.   Social History/SUBSTANCE ABUSE HISTORY:  Personal history of substance abuse: No   reports that she has been smoking cigarettes. She has never used smokeless tobacco.  LABS:  CBC  Lab Results   Component Value Date    WBC 7.33 04/09/2024    HGB 14.3 04/09/2024    HCT 45.3 04/09/2024     Coagulation Profile   Lab Results   Component Value Date     04/09/2024     No results found for: "PT", "PTT", "INR"  CMP:  BMP  Lab Results   Component Value Date     04/09/2024    K 4.7 " 04/09/2024     04/09/2024    CO2 27 04/09/2024    BUN 14 04/09/2024    CREATININE 0.8 04/09/2024    CALCIUM 10.1 04/09/2024    ANIONGAP 12 04/09/2024    EGFRNORACEVR >60.0 04/09/2024     Lab Results   Component Value Date    ALT 20 04/09/2024    AST 21 04/09/2024    ALKPHOS 75 04/09/2024    BILITOT 0.3 04/09/2024     HGBA1C:  Lab Results   Component Value Date    HGBA1C 5.4 04/09/2024       ROS:    Review of Systems   GENERAL:  No weight loss, malaise or fevers.  HEENT:   No recent changes in vision or hearing  NECK:  Negative for lumps, no difficulty with swallowing.  RESPIRATORY:  Negative for cough, wheezing or shortness of breath, patient denies any recent URI.  CARDIOVASCULAR:  Negative for chest pain or palpitations.  GI:  Negative for abdominal discomfort, blood in stools or black stools or change in bowel habits.  MUSCULOSKELETAL:  See HPI.  SKIN:  Negative for lesions, rash, and itching.  PSYCH:  No mood disorder or recent psychosocial stressors.   HEMATOLOGY/LYMPHOLOGY:  See the blood thinner sectioned in HPI.  NEURO:  See HPI  All other reviewed and negative other than HPI.    PHYSICAL EXAM:  VITALS: Ht 6' (1.829 m)   Wt 75.3 kg (166 lb 0.1 oz)   BMI 22.51 kg/m²   Body mass index is 22.51 kg/m².  GENERAL: Well appearing, in no acute distress, alert and oriented x3, answers questions appropriately.   PSYCH: Flat affect.  SKIN: Skin color, texture, turgor normal, no rashes or lesions.  HEAD/FACE:  Normocephalic, atraumatic. Cranial nerves grossly intact.  CV: Regular rate  PULM: No evidence of respiratory difficulty, symmetric chest rise.  GI:  Soft and non-Distended.  BACK/SIJ/HIP:  Lumbar Spine Exam:       Inspection: No erythema, bruising.       Palpation: (+++) TTP of lumbar paraspinals bilaterally      ROM:  Limited in flexion, extension, lateral bending.       (+++) Facet loading bilateral      (Negative) Straight Leg Raise, bilateral      (++) DANNA, Tenderness over the PSIS, Yeoman test,  bilateral  Hip Exam:      Inspection: No gross deformity or apparent leg length discrepancy      Palpation:  No TTP to bilateral greater trochanteric bursas.       ROM:  no limitation Due to pain in internal rotation, external rotation b/l  Neurologic Exam:     Alert. Speech is fluent and appropriate.     Strength: 5/5 in bilateral hip flexion and knee extension     Sensation:  Grossly intact to light touch in bilateral lower extremities     Tone: No abnormality appreciated in bilateral lower extremities    GAIT:  normal gait    DIAGNOSTIC STUDIES AND MEDICAL RECORDS REVIEW:  I have personally reviewed and interpreted relevant radiology reports and reviewed relevant records from other services in the EMR.   Ct lumbar spine 04/2024  Thin section noncontrast imaging is performed and viewed in multiple planes.     There is S-shaped scoliotic curvature of the lower thoracic and lumbar spine.  Rightward convexity is seen near the thoracolumbar junction with leftward convexity in the remainder of the lumbar spine.     The vertebral bodies are appropriately maintained in height.  Alignment is satisfactory.  Mild degrees of disc space narrowing occur towards the left of midline at the L2 and L3 disc levels with more significant disc space narrowing occurring predominantly towards the right of midline at L4-5 and L5-S1.     At T12-L1, there is no significant disc bulging or focal soft disc herniation.  The central canal and foramina are patent.     At L1-L2, minimal bulging of the disc margin occurs towards the left of midline.  There is no significant encroachment of the central canal or foramina.     At L2-3, shallow bulging of the disc margin results in mild diffuse mass effect upon the thecal sac.  There are mild facet degenerative changes.  The central canal and foramina are not significantly narrowed.     At L3-4, broad-based bulging of the disc margin results in diffuse mass effect upon the thecal sac.  In combination  with facet arthropathy there is mild central spinal canal narrowing.  The foramina are patent.     At L4-5, mild-moderate central canal narrowing results from shallow broad-based bulging of the disc margin and posterior osteophytic ridging which are asymmetric towards the right of midline.  There are mild bilateral facet degenerative changes.  There is mild right foraminal narrowing.     At L5-S1, mild-moderate central canal encroachment results from shallow broad-based disc bulging, posterior osteophytic ridging and bilateral facet degenerative changes.  There is mild bilateral foraminal narrowing.    -  MRI lumbar spine 04/2025  Mild to moderate convex right thoracolumbar spine curvature has apex at T12-L1 disc.     Minor rightward annular bulge without narrowing.     At L3-L4, 1-2 mm retrolisthesis and annular bulge indenting anterior thecal sac without significant central canal or neural foramen narrowing.  Mild loss of disc space height.     At L4-L5, 2 mm retrolisthesis and circumferential disc bulge combined with minor right facet joint osteoarthrosis to cause minor right neural foramen narrowing without central canal narrowing.  Mild right lateral recess narrowing is present.  No left neural foramen narrowing.  Right extraforaminal disc osteophyte complex is present.  Moderate loss of disc space height.     At L5-S1, 1 mm retrolisthesis and annular bulge combined with mild right and minor left facet joint osteoarthrosis to cause no significant central canal or neural foramen narrowing.  Right extraforaminal disc osteophyte approaches the right extraforaminal L5 spinal nerve without compression of such.  Mild loss of disc space height.     Vertebral bodies maintain normal bone marrow signal.  Conus terminates normally at L2.  Visualized paraspinal soft tissues are unremarkable aside from right renal cysts.     Impression:     1. Mild to moderate convex right thoracolumbar spine curvature.  2. Multilevel disc  degeneration and facet joint osteoarthrosis as described.    Clinical Impression:  This is a pleasant 49 y.o. female patient with PMH/PSH of  anxiety, depression, presenting with low back pain, mainly axial in nature,  patient was referred from Neurosurgery as she was not a surgical candidate, she has completed multiple sessions of physical therapy with limited benefit,  MRI lumbar spine that showed at L4-L5 facet arthropathy, right extraforaminal disc osteophyte, at L5-S1 facet arthropathy, right extra foramina disc osteophyte approaching the right extra foramina and L5 nerve root without compression   She does not have significant lumbar radiculitis,  her back pain mainly axial in nature.  Encounter Diagnosis:  Iam Metz is a 49 y.o. female with the following diagnoses based on history, exam, and imagin. Chronic bilateral low back pain without sciatica    2. Degeneration of intervertebral disc of lumbar region with discogenic back pain    3. Lumbar facet arthropathy       Treatment Plan:    Diagnostics/Referrals: na    Medications:    NSAIDs: OTC  Topical Agent: No  TCA/SSRI/SNRI: None  Anti-convulsants: None  Muscle Relaxants: None  Opioids: None    Interventional Therapy:   please schedule for bilateral lumbar medial branch block at L4-L5 and L5-S1  Sedation: Oral Sedation.  Anticoagulation medications: None -Clearance to stop Blood thinner: NA    Regarding the above interventions, the patient has been educated regarding the risks (including bleeding, infection, increased pain, nerve damage, or allergic reaction), benefits, and alternatives. The patient states she understands and is eager to proceed.    Physical Rehabilitation: Physician led exercise program and home exercise    Patient Education: Counseled patient regarding the importance of activity modification, I have stressed the importance of physical activity and a home exercise plan to help with pain and improve health.    Follow-up: RTC  after  ablation    May consider:  transforaminal, gabapentin ( she does not  prefer medication management)      Alok Lowery MD  Anesthesiologist  Interventional Pain Medicine  04/17/2025    Disclaimer:  This note was prepared using voice recognition system and is likely to have sound alike errors that may have been overlooked even after proof reading.  Please call me with any questions.

## 2025-04-17 NOTE — TELEPHONE ENCOUNTER
----- Message from Alok Lowery MD sent at 4/17/2025 11:36 AM CDT -----  · Interventional Therapy:   please schedule for bilateral lumbar medial branch block at L4-L5 and L5-S1· Sedation: Oral Sedation.· Anticoagulation medications: None -Clearance to stop Blood thinner: NA

## 2025-04-17 NOTE — PROGRESS NOTES
EST Patient Evaluation  [facility]    Iam Metz  : 1975  Date: 2025     CHIEF COMPLAINT:  No chief complaint on file.  Referring Physician: No ref. provider found  Primary Care Physician: Lizandro Stephen DO    HPI:  This is a 49 y.o. female with a chief complaint of No chief complaint on file.  . The patient has Past medical history/Past surgical history of  anxiety, depression    Patient was evaluated and referred by  orthopedic provider for low back pain, as patient has no surgical indication, patient had a recommendation for pain management referral and possible possible medial branch block.    Interval History 2025:  Iam Metz is here for  follow up visit  to discuss MRI lumbar spine that showed at L4-L5 facet arthropathy, right extraforaminal disc osteophyte, at L5-S1 facet arthropathy, right extra foramina disc osteophyte approaching the right extra foramina and L5 nerve root without compression    Current pain score: ***/10      Diabetic: No    Anticoagulation medications: None    Allergy To Iodine: No    Currently on Antibiotic: No    Pain Disability Index Review:      4/3/2025    11:17 AM   Last 3 PDI Scores   Pain Disability Index (PDI) 26     Current Description of Pain Symptoms:    History of Recent Fall or Trauma: No   Onset: Chronic, started on going  Pain Location:  low back pain  Radiates/associated symptoms:  upper back   Pain is Getting worse over the last 3 months   The pain is continuous.   The pain is described as aching, crushing, stabbing, and throbbing.   Exacerbating factors: Sitting, Standing, Walking, and Lifting.   Mitigating factors ice.   Symptoms interfere with daily activity, sleeping, and work  The patient feels like symptoms have been worsening.   Patient denies night fever/night sweats, urinary incontinence, bowel incontinence, significant weight loss, significant motor weakness, and loss of sensations.    Pain  "score:  Current: 2/10  Best: 2/10  Worst: 9/10  Current pain medications:  N/A    Current Narcotics/Opioid /benzo Medications:  Opioids- None  Benzodiazepines: No    UDS:  NA    PDMP:  Reviewed and consistent with medication use as prescribed.     Previous Chronic Pain Treatment History:  Six weeks of conservative therapy include: Physical Therapy/HEP/Physician Lead Exercise Program:  Over the past 12 months, Patient has done  9 sessions.  PT response: Not Helpful.   Dates of the PT sessions: 9-10/2024.  Is patient actively participating in home exercise program (HEP)/ physician led exercise program in the last 6 months: Yes.    Non-interventional Pain Therapy:  []Chiropractor.   []Acupuncture/Dry needle.  []TENS unit.  [x]Heat/ICE.  []Back Brace.    Medications previously tried:  NSAIDs: OTC, Ibuprofen (Advil/Motrin), Naproxen (Naprosyn), and Diclofenac  Topical Agent: Yes  TCA/SSRI/SNRI: SNRI: Venlafaxine (Effexor)   Anti-convulsants: NA  Muscle Relaxants: Tizanidine (Zanaflex)  Opioids- None.    Interventional Pain Procedures:  N/A    Previous spine/Relevant joint surgery:  N/A  Surgical History:   has a past surgical history that includes breast implants and arm surgery.  Medical History:   has a past medical history of Anxiety, Depression, and Scoliosis.  Family History:  family history includes Alzheimer's disease in her father; Atrial fibrillation in her mother; Depression in her father; Hypertension in her mother.  Allergies:  Patient has no known allergies.   Social History/SUBSTANCE ABUSE HISTORY:  Personal history of substance abuse: No   reports that she has been smoking cigarettes. She has never used smokeless tobacco.  LABS:  CBC  Lab Results   Component Value Date    WBC 7.33 04/09/2024    HGB 14.3 04/09/2024    HCT 45.3 04/09/2024     Coagulation Profile   Lab Results   Component Value Date     04/09/2024     No results found for: "PT", "PTT", "INR"  CMP:  BMP  Lab Results   Component Value " Date     04/09/2024    K 4.7 04/09/2024     04/09/2024    CO2 27 04/09/2024    BUN 14 04/09/2024    CREATININE 0.8 04/09/2024    CALCIUM 10.1 04/09/2024    ANIONGAP 12 04/09/2024    EGFRNORACEVR >60.0 04/09/2024     Lab Results   Component Value Date    ALT 20 04/09/2024    AST 21 04/09/2024    ALKPHOS 75 04/09/2024    BILITOT 0.3 04/09/2024     HGBA1C:  Lab Results   Component Value Date    HGBA1C 5.4 04/09/2024       ROS:    Review of Systems   GENERAL:  No weight loss, malaise or fevers.  HEENT:   No recent changes in vision or hearing  NECK:  Negative for lumps, no difficulty with swallowing.  RESPIRATORY:  Negative for cough, wheezing or shortness of breath, patient denies any recent URI.  CARDIOVASCULAR:  Negative for chest pain or palpitations.  GI:  Negative for abdominal discomfort, blood in stools or black stools or change in bowel habits.  MUSCULOSKELETAL:  See HPI.  SKIN:  Negative for lesions, rash, and itching.  PSYCH:  No mood disorder or recent psychosocial stressors.   HEMATOLOGY/LYMPHOLOGY:  See the blood thinner sectioned in HPI.  NEURO:  See HPI  All other reviewed and negative other than HPI.    PHYSICAL EXAM:  VITALS: There were no vitals taken for this visit.  There is no height or weight on file to calculate BMI.  GENERAL: Well appearing, in no acute distress, alert and oriented x3, answers questions appropriately.   PSYCH: Flat affect.  SKIN: Skin color, texture, turgor normal, no rashes or lesions.  HEAD/FACE:  Normocephalic, atraumatic. Cranial nerves grossly intact.  CV: Regular rate  PULM: No evidence of respiratory difficulty, symmetric chest rise.  GI:  Soft and non-Distended.  BACK/SIJ/HIP:  Lumbar Spine Exam:       Inspection: No erythema, bruising.       Palpation: (+++) TTP of lumbar paraspinals bilaterally      ROM:  Limited in flexion, extension, lateral bending.       (+++) Facet loading bilateral      (Negative) Straight Leg Raise, bilateral      (++) DANNA  Tenderness over the PSIS, Yeoman test, bilateral  Hip Exam:      Inspection: No gross deformity or apparent leg length discrepancy      Palpation:  No TTP to bilateral greater trochanteric bursas.       ROM:  no limitation Due to pain in internal rotation, external rotation b/l  Neurologic Exam:     Alert. Speech is fluent and appropriate.     Strength: 5/5 in bilateral hip flexion and knee extension     Sensation:  Grossly intact to light touch in bilateral lower extremities     Tone: No abnormality appreciated in bilateral lower extremities    GAIT:  normal gait    DIAGNOSTIC STUDIES AND MEDICAL RECORDS REVIEW:  I have personally reviewed and interpreted relevant radiology reports and reviewed relevant records from other services in the EMR.   Ct lumbar spine 04/2024  Thin section noncontrast imaging is performed and viewed in multiple planes.     There is S-shaped scoliotic curvature of the lower thoracic and lumbar spine.  Rightward convexity is seen near the thoracolumbar junction with leftward convexity in the remainder of the lumbar spine.     The vertebral bodies are appropriately maintained in height.  Alignment is satisfactory.  Mild degrees of disc space narrowing occur towards the left of midline at the L2 and L3 disc levels with more significant disc space narrowing occurring predominantly towards the right of midline at L4-5 and L5-S1.     At T12-L1, there is no significant disc bulging or focal soft disc herniation.  The central canal and foramina are patent.     At L1-L2, minimal bulging of the disc margin occurs towards the left of midline.  There is no significant encroachment of the central canal or foramina.     At L2-3, shallow bulging of the disc margin results in mild diffuse mass effect upon the thecal sac.  There are mild facet degenerative changes.  The central canal and foramina are not significantly narrowed.     At L3-4, broad-based bulging of the disc margin results in diffuse mass  effect upon the thecal sac.  In combination with facet arthropathy there is mild central spinal canal narrowing.  The foramina are patent.     At L4-5, mild-moderate central canal narrowing results from shallow broad-based bulging of the disc margin and posterior osteophytic ridging which are asymmetric towards the right of midline.  There are mild bilateral facet degenerative changes.  There is mild right foraminal narrowing.     At L5-S1, mild-moderate central canal encroachment results from shallow broad-based disc bulging, posterior osteophytic ridging and bilateral facet degenerative changes.  There is mild bilateral foraminal narrowing.    -  MRI lumbar spine 04/2025  Mild to moderate convex right thoracolumbar spine curvature has apex at T12-L1 disc.     Minor rightward annular bulge without narrowing.     At L3-L4, 1-2 mm retrolisthesis and annular bulge indenting anterior thecal sac without significant central canal or neural foramen narrowing.  Mild loss of disc space height.     At L4-L5, 2 mm retrolisthesis and circumferential disc bulge combined with minor right facet joint osteoarthrosis to cause minor right neural foramen narrowing without central canal narrowing.  Mild right lateral recess narrowing is present.  No left neural foramen narrowing.  Right extraforaminal disc osteophyte complex is present.  Moderate loss of disc space height.     At L5-S1, 1 mm retrolisthesis and annular bulge combined with mild right and minor left facet joint osteoarthrosis to cause no significant central canal or neural foramen narrowing.  Right extraforaminal disc osteophyte approaches the right extraforaminal L5 spinal nerve without compression of such.  Mild loss of disc space height.     Vertebral bodies maintain normal bone marrow signal.  Conus terminates normally at L2.  Visualized paraspinal soft tissues are unremarkable aside from right renal cysts.     Impression:     1. Mild to moderate convex right  thoracolumbar spine curvature.  2. Multilevel disc degeneration and facet joint osteoarthrosis as described.    Clinical Impression:  This is a pleasant 49 y.o. female patient with PMH/PSH of  anxiety, depression, presenting with low back pain, mainly axial in nature,  patient was referred from Neurosurgery as she was not a surgical candidate, she has completed multiple sessions of physical therapy with limited benefit,  MRI lumbar spine that showed at L4-L5 facet arthropathy, right extraforaminal disc osteophyte, at L5-S1 facet arthropathy, right extra foramina disc osteophyte approaching the right extra foramina and L5 nerve root without compression    Encounter Diagnosis:  Iam Metz is a 49 y.o. female with the following diagnoses based on history, exam, and imaging:  There are no diagnoses linked to this encounter.     Treatment Plan:    Diagnostics/Referrals: MRI lumbar spine+  x-ray lumbar spine    Medications:    NSAIDs: OTC  Topical Agent: No  TCA/SSRI/SNRI: None  Anti-convulsants: None  Muscle Relaxants: None  Opioids: None    Interventional Therapy:   please schedule for bilateral lumbar medial branch block at L4-L5 and L5-S1  Sedation: Oral Sedation.  Anticoagulation medications: None -Clearance to stop Blood thinner: NA    Regarding the above interventions, the patient has been educated regarding the risks (including bleeding, infection, increased pain, nerve damage, or allergic reaction), benefits, and alternatives. The patient states she understands and is eager to proceed.    Physical Rehabilitation: Physician led exercise program and home exercise    Patient Education: Counseled patient regarding the importance of activity modification, I have stressed the importance of physical activity and a home exercise plan to help with pain and improve health.    Follow-up: RTC  after ablation    May consider:  transforaminal      Alok Lowery MD  Anesthesiologist  Interventional Pain  Medicine  04/17/2025    Disclaimer:  This note was prepared using voice recognition system and is likely to have sound alike errors that may have been overlooked even after proof reading.  Please call me with any questions.

## 2025-04-25 ENCOUNTER — TELEPHONE (OUTPATIENT)
Dept: SURGERY | Facility: HOSPITAL | Age: 50
End: 2025-04-25
Payer: MEDICAID

## 2025-04-28 ENCOUNTER — HOSPITAL ENCOUNTER (OUTPATIENT)
Facility: HOSPITAL | Age: 50
Discharge: HOME | End: 2025-04-28
Attending: ANESTHESIOLOGY | Admitting: ANESTHESIOLOGY
Payer: MEDICAID

## 2025-04-28 ENCOUNTER — HOSPITAL ENCOUNTER (OUTPATIENT)
Dept: RADIOLOGY | Facility: HOSPITAL | Age: 50
Discharge: HOME | End: 2025-04-28
Attending: ANESTHESIOLOGY | Admitting: ANESTHESIOLOGY
Payer: MEDICAID

## 2025-04-28 VITALS
SYSTOLIC BLOOD PRESSURE: 130 MMHG | RESPIRATION RATE: 16 BRPM | DIASTOLIC BLOOD PRESSURE: 67 MMHG | OXYGEN SATURATION: 98 % | HEART RATE: 84 BPM | TEMPERATURE: 98 F

## 2025-04-28 DIAGNOSIS — Z01.818 PREOP TESTING: ICD-10-CM

## 2025-04-28 DIAGNOSIS — M47.816 LUMBAR SPONDYLOSIS: ICD-10-CM

## 2025-04-28 DIAGNOSIS — M47.816 LUMBAR SPONDYLOSIS: Primary | ICD-10-CM

## 2025-04-28 DIAGNOSIS — F41.9 ANXIETY: ICD-10-CM

## 2025-04-28 LAB
B-HCG UR QL: NEGATIVE
CTP QC/QA: YES

## 2025-04-28 PROCEDURE — 64493 INJ PARAVERT F JNT L/S 1 LEV: CPT | Mod: 50,KX,, | Performed by: ANESTHESIOLOGY

## 2025-04-28 PROCEDURE — 25000003 PHARM REV CODE 250: Performed by: ANESTHESIOLOGY

## 2025-04-28 PROCEDURE — 64493 INJ PARAVERT F JNT L/S 1 LEV: CPT | Mod: 50 | Performed by: ANESTHESIOLOGY

## 2025-04-28 PROCEDURE — 63600175 PHARM REV CODE 636 W HCPCS: Performed by: ANESTHESIOLOGY

## 2025-04-28 PROCEDURE — 76000 FLUOROSCOPY <1 HR PHYS/QHP: CPT | Mod: TC

## 2025-04-28 PROCEDURE — 64494 INJ PARAVERT F JNT L/S 2 LEV: CPT | Mod: 50 | Performed by: ANESTHESIOLOGY

## 2025-04-28 PROCEDURE — 64494 INJ PARAVERT F JNT L/S 2 LEV: CPT | Mod: 50,KX,, | Performed by: ANESTHESIOLOGY

## 2025-04-28 RX ORDER — ALPRAZOLAM 0.25 MG/1
0.5 TABLET, ORALLY DISINTEGRATING ORAL
Status: DISCONTINUED | OUTPATIENT
Start: 2025-04-28 | End: 2025-04-28 | Stop reason: HOSPADM

## 2025-04-28 RX ORDER — BUPIVACAINE HYDROCHLORIDE 5 MG/ML
INJECTION, SOLUTION EPIDURAL; INTRACAUDAL; PERINEURAL
Status: DISCONTINUED | OUTPATIENT
Start: 2025-04-28 | End: 2025-04-28 | Stop reason: HOSPADM

## 2025-04-28 RX ORDER — LIDOCAINE HYDROCHLORIDE 20 MG/ML
INJECTION, SOLUTION INFILTRATION; PERINEURAL
Status: DISCONTINUED | OUTPATIENT
Start: 2025-04-28 | End: 2025-04-28 | Stop reason: HOSPADM

## 2025-04-28 RX ADMIN — ALPRAZOLAM 0.5 MG: 0.25 TABLET, ORALLY DISINTEGRATING ORAL at 01:04

## 2025-04-28 NOTE — OP NOTE
Diagnostic Lumbar Medial Branch Block Under Fluoroscopy    The procedure, risks, benefits, and options were discussed with the patient. There are no contraindications to the procedure. The patent expressed understanding and agreed to the procedure. Informed written consent was obtained prior to the start of the procedure and can be found in the patient's chart.    PATIENT NAME: Iam Metz   MRN: [id number]     DATE OF PROCEDURE: 04/28/2025                                           PROCEDURE:  Diagnostic Bilateral L3, L4, and L5 Lumbar Medial Branch Block under Fluoroscopy    PRE-OP DIAGNOSIS: Lumbar spondylosis [M47.816] Lumbar spondylosis [M47.816]    POST-OP DIAGNOSIS: Same    PHYSICIAN: Alok Lowery MD    MEDICATIONS INJECTED:  Bupivicaine 0.25%    LOCAL ANESTHETIC INJECTED:   Xylocaine 2%    SEDATION: oral    ESTIMATED BLOOD LOSS:  None    COMPLICATIONS:  None.    INTERVAL HISTORY: Patient has clinical and imaging findings suggestive of facet mediated pain.    TECHNIQUE: Time-out was performed to identify the patient and procedure to be performed. With the patient laying in a prone position, the surgical area was prepped and draped in the usual sterile fashion using ChloraPrep and fenestrated drape. The levels were determined under fluoroscopic guidance. Skin anesthesia was achieved by injecting Lidocaine 2% over the injection sites. A 22 gauge, 3.5 inch needle was introduced into the medial branch nerves at the junctions of the superior articular process and the transverse processes of the targeted sites using AP, lateral and/or contralateral oblique fluoroscopic imaging. After negative aspiration for blood or CSF was confirmed, 0.5 mL of the anesthetic listed above was then slowly injected at each site. The needles were removed and bleeding was nil. A sterile dressing was applied. No specimens collected. The patient tolerated the procedure well.    The patient was monitored after the procedure in the  recovery area. They were given post-procedure and discharge instructions to follow at home. The patient was discharged in a stable condition.    Alok Lowery MD

## 2025-04-29 ENCOUNTER — PATIENT MESSAGE (OUTPATIENT)
Dept: PAIN MEDICINE | Facility: CLINIC | Age: 50
End: 2025-04-29
Payer: MEDICAID

## 2025-04-29 NOTE — TELEPHONE ENCOUNTER
Patient with LMBB L4-5, L5-S1 4/28. States as of now, would not say she had 80% relief. She will continue to monitor and follow up on Thursday.

## (undated) DEVICE — CHLORAPREP 10.5 ML APPLICATOR

## (undated) DEVICE — KIT NERVE BLOCK PREP BAPTIST

## (undated) DEVICE — MARKER SKIN RULER AND LABEL